# Patient Record
Sex: FEMALE | Race: BLACK OR AFRICAN AMERICAN | NOT HISPANIC OR LATINO
[De-identification: names, ages, dates, MRNs, and addresses within clinical notes are randomized per-mention and may not be internally consistent; named-entity substitution may affect disease eponyms.]

---

## 2020-01-08 ENCOUNTER — APPOINTMENT (OUTPATIENT)
Dept: OBGYN | Facility: CLINIC | Age: 50
End: 2020-01-08
Payer: COMMERCIAL

## 2020-01-08 VITALS — SYSTOLIC BLOOD PRESSURE: 134 MMHG | DIASTOLIC BLOOD PRESSURE: 81 MMHG | WEIGHT: 168 LBS | HEART RATE: 80 BPM

## 2020-01-08 DIAGNOSIS — D25.9 LEIOMYOMA OF UTERUS, UNSPECIFIED: ICD-10-CM

## 2020-01-08 DIAGNOSIS — J45.909 UNSPECIFIED ASTHMA, UNCOMPLICATED: ICD-10-CM

## 2020-01-08 DIAGNOSIS — Z80.42 FAMILY HISTORY OF MALIGNANT NEOPLASM OF PROSTATE: ICD-10-CM

## 2020-01-08 DIAGNOSIS — Z82.49 FAMILY HISTORY OF ISCHEMIC HEART DISEASE AND OTHER DISEASES OF THE CIRCULATORY SYSTEM: ICD-10-CM

## 2020-01-08 PROBLEM — Z00.00 ENCOUNTER FOR PREVENTIVE HEALTH EXAMINATION: Status: ACTIVE | Noted: 2020-01-08

## 2020-01-08 PROCEDURE — 99203 OFFICE O/P NEW LOW 30 MIN: CPT

## 2020-01-08 RX ORDER — FERROUS SULFATE 325(65) MG
325 (65 FE) TABLET ORAL
Refills: 0 | Status: ACTIVE | COMMUNITY
Start: 2020-01-08

## 2020-01-09 NOTE — HISTORY OF PRESENT ILLNESS
[Last Mammogram ___] : Last Mammogram was [unfilled] [Last Pap ___] : Last cervical pap smear was [unfilled] [Last Colonoscopy ___] : Last colonoscopy [unfilled] [Regular Cycle Intervals] : periods have been regular [Sexually Active] : is not sexually active

## 2020-02-20 ENCOUNTER — APPOINTMENT (OUTPATIENT)
Dept: OBGYN | Facility: CLINIC | Age: 50
End: 2020-02-20
Payer: COMMERCIAL

## 2020-02-20 PROCEDURE — 36415 COLL VENOUS BLD VENIPUNCTURE: CPT

## 2020-02-20 PROCEDURE — 99213 OFFICE O/P EST LOW 20 MIN: CPT

## 2020-02-20 PROCEDURE — 99203 OFFICE O/P NEW LOW 30 MIN: CPT

## 2020-03-09 ENCOUNTER — OUTPATIENT (OUTPATIENT)
Dept: OUTPATIENT SERVICES | Facility: HOSPITAL | Age: 50
LOS: 1 days | End: 2020-03-09
Payer: COMMERCIAL

## 2020-03-09 ENCOUNTER — APPOINTMENT (OUTPATIENT)
Dept: MRI IMAGING | Facility: CLINIC | Age: 50
End: 2020-03-09
Payer: COMMERCIAL

## 2020-03-09 DIAGNOSIS — Z00.8 ENCOUNTER FOR OTHER GENERAL EXAMINATION: ICD-10-CM

## 2020-03-09 PROCEDURE — 72197 MRI PELVIS W/O & W/DYE: CPT | Mod: 26

## 2020-03-09 PROCEDURE — A9585: CPT

## 2020-03-09 PROCEDURE — 72197 MRI PELVIS W/O & W/DYE: CPT

## 2020-04-10 ENCOUNTER — APPOINTMENT (OUTPATIENT)
Dept: OBGYN | Facility: CLINIC | Age: 50
End: 2020-04-10

## 2020-08-12 ENCOUNTER — APPOINTMENT (OUTPATIENT)
Dept: OBGYN | Facility: CLINIC | Age: 50
End: 2020-08-12
Payer: COMMERCIAL

## 2020-08-12 ENCOUNTER — RESULT REVIEW (OUTPATIENT)
Age: 50
End: 2020-08-12

## 2020-08-12 PROCEDURE — 36415 COLL VENOUS BLD VENIPUNCTURE: CPT

## 2020-08-12 PROCEDURE — 58100 BIOPSY OF UTERUS LINING: CPT

## 2020-08-26 ENCOUNTER — TRANSCRIPTION ENCOUNTER (OUTPATIENT)
Age: 50
End: 2020-08-26

## 2020-09-25 ENCOUNTER — OUTPATIENT (OUTPATIENT)
Dept: OUTPATIENT SERVICES | Facility: HOSPITAL | Age: 50
LOS: 1 days | Discharge: HOME | End: 2020-09-25

## 2020-09-25 DIAGNOSIS — D64.9 ANEMIA, UNSPECIFIED: ICD-10-CM

## 2020-10-05 ENCOUNTER — RESULT REVIEW (OUTPATIENT)
Age: 50
End: 2020-10-05

## 2020-10-05 ENCOUNTER — APPOINTMENT (OUTPATIENT)
Dept: HEMATOLOGY ONCOLOGY | Facility: CLINIC | Age: 50
End: 2020-10-05
Payer: COMMERCIAL

## 2020-10-05 VITALS
SYSTOLIC BLOOD PRESSURE: 119 MMHG | TEMPERATURE: 97.3 F | OXYGEN SATURATION: 100 % | RESPIRATION RATE: 16 BRPM | WEIGHT: 169.76 LBS | HEIGHT: 66.14 IN | BODY MASS INDEX: 27.28 KG/M2 | HEART RATE: 77 BPM | DIASTOLIC BLOOD PRESSURE: 80 MMHG

## 2020-10-05 DIAGNOSIS — N92.0 EXCESSIVE AND FREQUENT MENSTRUATION WITH REGULAR CYCLE: ICD-10-CM

## 2020-10-05 DIAGNOSIS — D64.9 ANEMIA, UNSPECIFIED: ICD-10-CM

## 2020-10-05 LAB
BASOPHILS # BLD AUTO: 0.03 K/UL — SIGNIFICANT CHANGE UP (ref 0–0.2)
BASOPHILS NFR BLD AUTO: 0.8 % — SIGNIFICANT CHANGE UP (ref 0–2)
EOSINOPHIL # BLD AUTO: 0.26 K/UL — SIGNIFICANT CHANGE UP (ref 0–0.5)
EOSINOPHIL NFR BLD AUTO: 7.4 % — HIGH (ref 0–6)
HCT VFR BLD CALC: 38 % — SIGNIFICANT CHANGE UP (ref 34.5–45)
HGB BLD-MCNC: 11.4 G/DL — LOW (ref 11.5–15.5)
IMM GRANULOCYTES NFR BLD AUTO: 0.3 % — SIGNIFICANT CHANGE UP (ref 0–1.5)
LYMPHOCYTES # BLD AUTO: 1.33 K/UL — SIGNIFICANT CHANGE UP (ref 1–3.3)
LYMPHOCYTES # BLD AUTO: 37.7 % — SIGNIFICANT CHANGE UP (ref 13–44)
MCHC RBC-ENTMCNC: 25.6 PG — LOW (ref 27–34)
MCHC RBC-ENTMCNC: 30 G/DL — LOW (ref 32–36)
MCV RBC AUTO: 85.4 FL — SIGNIFICANT CHANGE UP (ref 80–100)
MONOCYTES # BLD AUTO: 0.32 K/UL — SIGNIFICANT CHANGE UP (ref 0–0.9)
MONOCYTES NFR BLD AUTO: 9.1 % — SIGNIFICANT CHANGE UP (ref 2–14)
NEUTROPHILS # BLD AUTO: 1.58 K/UL — LOW (ref 1.8–7.4)
NEUTROPHILS NFR BLD AUTO: 44.7 % — SIGNIFICANT CHANGE UP (ref 43–77)
NRBC # BLD: 0 /100 WBCS — SIGNIFICANT CHANGE UP (ref 0–0)
PLATELET # BLD AUTO: 248 K/UL — SIGNIFICANT CHANGE UP (ref 150–400)
RBC # BLD: 4.45 M/UL — SIGNIFICANT CHANGE UP (ref 3.8–5.2)
RBC # FLD: 17.6 % — HIGH (ref 10.3–14.5)
RETICS #: 124.5 K/UL — SIGNIFICANT CHANGE UP (ref 25–125)
RETICS/RBC NFR: 2.8 % — HIGH (ref 0.5–2.5)
WBC # BLD: 3.53 K/UL — LOW (ref 3.8–10.5)
WBC # FLD AUTO: 3.53 K/UL — LOW (ref 3.8–10.5)

## 2020-10-05 PROCEDURE — XXXXX: CPT

## 2020-10-12 ENCOUNTER — OUTPATIENT (OUTPATIENT)
Dept: OUTPATIENT SERVICES | Facility: HOSPITAL | Age: 50
LOS: 1 days | End: 2020-10-12
Payer: COMMERCIAL

## 2020-10-12 VITALS
SYSTOLIC BLOOD PRESSURE: 106 MMHG | WEIGHT: 169.98 LBS | DIASTOLIC BLOOD PRESSURE: 73 MMHG | RESPIRATION RATE: 16 BRPM | TEMPERATURE: 98 F | OXYGEN SATURATION: 98 % | HEART RATE: 80 BPM | HEIGHT: 65 IN

## 2020-10-12 DIAGNOSIS — Z29.9 ENCOUNTER FOR PROPHYLACTIC MEASURES, UNSPECIFIED: ICD-10-CM

## 2020-10-12 DIAGNOSIS — Z98.890 OTHER SPECIFIED POSTPROCEDURAL STATES: Chronic | ICD-10-CM

## 2020-10-12 DIAGNOSIS — N92.1 EXCESSIVE AND FREQUENT MENSTRUATION WITH IRREGULAR CYCLE: ICD-10-CM

## 2020-10-12 DIAGNOSIS — D25.9 LEIOMYOMA OF UTERUS, UNSPECIFIED: ICD-10-CM

## 2020-10-12 DIAGNOSIS — Z01.818 ENCOUNTER FOR OTHER PREPROCEDURAL EXAMINATION: ICD-10-CM

## 2020-10-12 LAB
A1C WITH ESTIMATED AVERAGE GLUCOSE RESULT: 5.3 % — SIGNIFICANT CHANGE UP (ref 4–5.6)
ANION GAP SERPL CALC-SCNC: 9 MMOL/L — SIGNIFICANT CHANGE UP (ref 5–17)
BLD GP AB SCN SERPL QL: NEGATIVE — SIGNIFICANT CHANGE UP
BUN SERPL-MCNC: 16 MG/DL — SIGNIFICANT CHANGE UP (ref 7–23)
CALCIUM SERPL-MCNC: 9.6 MG/DL — SIGNIFICANT CHANGE UP (ref 8.4–10.5)
CHLORIDE SERPL-SCNC: 103 MMOL/L — SIGNIFICANT CHANGE UP (ref 96–108)
CO2 SERPL-SCNC: 26 MMOL/L — SIGNIFICANT CHANGE UP (ref 22–31)
CREAT SERPL-MCNC: 0.88 MG/DL — SIGNIFICANT CHANGE UP (ref 0.5–1.3)
ESTIMATED AVERAGE GLUCOSE: 105 MG/DL — SIGNIFICANT CHANGE UP (ref 68–114)
GLUCOSE SERPL-MCNC: 102 MG/DL — HIGH (ref 70–99)
HCV AB S/CO SERPL IA: 0.13 S/CO — SIGNIFICANT CHANGE UP (ref 0–0.99)
HCV AB SERPL-IMP: SIGNIFICANT CHANGE UP
HIV 1+2 AB+HIV1 P24 AG SERPL QL IA: SIGNIFICANT CHANGE UP
POTASSIUM SERPL-MCNC: 4.4 MMOL/L — SIGNIFICANT CHANGE UP (ref 3.5–5.3)
POTASSIUM SERPL-SCNC: 4.4 MMOL/L — SIGNIFICANT CHANGE UP (ref 3.5–5.3)
RH IG SCN BLD-IMP: NEGATIVE — SIGNIFICANT CHANGE UP
SODIUM SERPL-SCNC: 138 MMOL/L — SIGNIFICANT CHANGE UP (ref 135–145)

## 2020-10-12 PROCEDURE — 86901 BLOOD TYPING SEROLOGIC RH(D): CPT

## 2020-10-12 PROCEDURE — 86803 HEPATITIS C AB TEST: CPT

## 2020-10-12 PROCEDURE — 86900 BLOOD TYPING SEROLOGIC ABO: CPT

## 2020-10-12 PROCEDURE — 83036 HEMOGLOBIN GLYCOSYLATED A1C: CPT

## 2020-10-12 PROCEDURE — 86850 RBC ANTIBODY SCREEN: CPT

## 2020-10-12 PROCEDURE — G0463: CPT

## 2020-10-12 PROCEDURE — 80048 BASIC METABOLIC PNL TOTAL CA: CPT

## 2020-10-12 PROCEDURE — 87389 HIV-1 AG W/HIV-1&-2 AB AG IA: CPT

## 2020-10-12 NOTE — H&P PST ADULT - NSANTHOSAYNRD_GEN_A_CORE
No. [place] screening performed.  STOP BANG Legend: 0-2 = LOW Risk; 3-4 = INTERMEDIATE Risk; 5-8 = HIGH Risk

## 2020-10-12 NOTE — H&P PST ADULT - ALLERGY TYPES
indoor environmental allergies/reactions to medicines/outdoor environmental allergies/reactions to food

## 2020-10-12 NOTE — H&P PST ADULT - NSICDXPASTMEDICALHX_GEN_ALL_CORE_FT
PAST MEDICAL HISTORY:  Asthma     Facial eczema     LORENA (iron deficiency anemia)     Menorrhagia with irregular cycle     Menstrual spotting     Seasonal allergies     Uterine fibroid

## 2020-10-12 NOTE — H&P PST ADULT - HISTORY OF PRESENT ILLNESS
51 y/o female with h/o Asthma and uterine fibroids s/p uterine embolization 2018, c/o menorrhagia with regular cycle,  spotting between period, bloating and feels that fibroids are growing. Today she presents to PST for scheduled Total Abdominal Hysterectomy Bilateral Salpingectomy on 10/19/20. Denies any palpitations, SOB, N/V, fever or chills.     ***COVID swab scheduled for 10/16/20***

## 2020-10-12 NOTE — H&P PST ADULT - NSICDXPROBLEM_GEN_ALL_CORE_FT
PROBLEM DIAGNOSES  Problem: Fibroid, uterine  Assessment and Plan: Total Abdominal Hysterectomy Bilateral Salpingectomy on 10/19/20.  Pre-op education provided - all questions answered. Pt verbalized understanding     Problem: Need for prophylactic measure  Assessment and Plan: The Caprini score indicates this patient is at risk for a VTE event (score 3-5).  Most surgical patients in this group would benefit from pharmacologic prophylaxis.  The surgical team will determine the balance between VTE risk and bleeding risk

## 2020-10-12 NOTE — H&P PST ADULT - ATTENDING COMMENTS
pt seen and examined. all questions answered. to proceed with scheduled procedure. total abdominal hysterectomy, bilateral salpingectomy.     ELI Daniel

## 2020-10-12 NOTE — H&P PST ADULT - HARM RISK FACTORS
Gisell Boothe)  Pediatrics  [facility] [place] [address]  Phone: [phone]  Fax: [phone]  Follow Up Time: 1-3 Days
no

## 2020-10-12 NOTE — H&P PST ADULT - ASSESSMENT
KATIEI VTE 2.0 SCORE [CLOT updated 2019]    AGE RELATED RISK FACTORS                                                       MOBILITY RELATED FACTORS  [x ] Age 41-60 years                                            (1 Point)                    [ ] Bed rest                                                        (1 Point)  [ ] Age: 61-74 years                                           (2 Points)                  [ ] Plaster cast                                                   (2 Points)  [ ] Age= 75 years                                              (3 Points)                    [ ] Bed bound for more than 72 hours                 (2 Points)    DISEASE RELATED RISK FACTORS                                               GENDER SPECIFIC FACTORS  [ ] Edema in the lower extremities                       (1 Point)              [ ] Pregnancy                                                     (1 Point)  [ ] Varicose veins                                               (1 Point)                     [ ] Post-partum < 6 weeks                                   (1 Point)             [x ] BMI > 25 Kg/m2                                            (1 Point)                     [ ] Hormonal therapy  or oral contraception          (1 Point)                 [ ] Sepsis (in the previous month)                        (1 Point)               [ ] History of pregnancy complications                 (1 point)  [ ] Pneumonia or serious lung disease                                               [ ] Unexplained or recurrent                     (1 Point)           (in the previous month)                               (1 Point)  [ ] Abnormal pulmonary function test                     (1 Point)                 SURGERY RELATED RISK FACTORS  [ ] Acute myocardial infarction                              (1 Point)               [ ]  Section                                             (1 Point)  [ ] Congestive heart failure (in the previous month)  (1 Point)      [ ] Minor surgery                                                  (1 Point)   [ ] Inflammatory bowel disease                             (1 Point)               [ ] Arthroscopic surgery                                        (2 Points)  [ ] Central venous access                                      (2 Points)                [ x] General surgery lasting more than 45 minutes (2 points)  [ ] Malignancy- Present or previous                   (2 Points)                [ ] Elective arthroplasty                                         (5 points)    [ ] Stroke (in the previous month)                          (5 Points)                                                                                                                                                           HEMATOLOGY RELATED FACTORS                                                 TRAUMA RELATED RISK FACTORS  [ ] Prior episodes of VTE                                     (3 Points)                [ ] Fracture of the hip, pelvis, or leg                       (5 Points)  [ ] Positive family history for VTE                         (3 Points)             [ ] Acute spinal cord injury (in the previous month)  (5 Points)  [ ] Prothrombin 32751 A                                     (3 Points)               [ ] Paralysis  (less than 1 month)                             (5 Points)  [ ] Factor V Leiden                                             (3 Points)                  [ ] Multiple Trauma within 1 month                        (5 Points)  [ ] Lupus anticoagulants                                     (3 Points)                                                           [ ] Anticardiolipin antibodies                               (3 Points)                                                       [ ] High homocysteine in the blood                      (3 Points)                                             [ ] Other congenital or acquired thrombophilia      (3 Points)                                                [ ] Heparin induced thrombocytopenia                  (3 Points)                                     Total Score [   4       ]

## 2020-10-14 DIAGNOSIS — Z01.818 ENCOUNTER FOR OTHER PREPROCEDURAL EXAMINATION: ICD-10-CM

## 2020-10-16 ENCOUNTER — APPOINTMENT (OUTPATIENT)
Dept: DISASTER EMERGENCY | Facility: CLINIC | Age: 50
End: 2020-10-16

## 2020-10-17 LAB — SARS-COV-2 N GENE NPH QL NAA+PROBE: NOT DETECTED

## 2020-10-18 ENCOUNTER — TRANSCRIPTION ENCOUNTER (OUTPATIENT)
Age: 50
End: 2020-10-18

## 2020-10-19 ENCOUNTER — INPATIENT (INPATIENT)
Facility: HOSPITAL | Age: 50
LOS: 2 days | Discharge: HOME | DRG: 743 | End: 2020-10-22
Attending: STUDENT IN AN ORGANIZED HEALTH CARE EDUCATION/TRAINING PROGRAM | Admitting: STUDENT IN AN ORGANIZED HEALTH CARE EDUCATION/TRAINING PROGRAM
Payer: COMMERCIAL

## 2020-10-19 ENCOUNTER — APPOINTMENT (OUTPATIENT)
Dept: OBGYN | Facility: HOSPITAL | Age: 50
End: 2020-10-19

## 2020-10-19 ENCOUNTER — RESULT REVIEW (OUTPATIENT)
Age: 50
End: 2020-10-19

## 2020-10-19 VITALS
OXYGEN SATURATION: 97 % | HEIGHT: 65 IN | RESPIRATION RATE: 18 BRPM | HEART RATE: 89 BPM | SYSTOLIC BLOOD PRESSURE: 122 MMHG | TEMPERATURE: 98 F | WEIGHT: 170.64 LBS | DIASTOLIC BLOOD PRESSURE: 79 MMHG

## 2020-10-19 DIAGNOSIS — N92.1 EXCESSIVE AND FREQUENT MENSTRUATION WITH IRREGULAR CYCLE: ICD-10-CM

## 2020-10-19 DIAGNOSIS — Z98.890 OTHER SPECIFIED POSTPROCEDURAL STATES: Chronic | ICD-10-CM

## 2020-10-19 DIAGNOSIS — D25.9 LEIOMYOMA OF UTERUS, UNSPECIFIED: ICD-10-CM

## 2020-10-19 LAB
ALBUMIN SERPL ELPH-MCNC: 3.6 G/DL — SIGNIFICANT CHANGE UP (ref 3.3–5)
ALP SERPL-CCNC: 46 U/L — SIGNIFICANT CHANGE UP (ref 40–120)
ALT FLD-CCNC: 8 U/L — LOW (ref 10–45)
ANION GAP SERPL CALC-SCNC: 11 MMOL/L — SIGNIFICANT CHANGE UP (ref 5–17)
AST SERPL-CCNC: 23 U/L — SIGNIFICANT CHANGE UP (ref 10–40)
BILIRUB SERPL-MCNC: 0.2 MG/DL — SIGNIFICANT CHANGE UP (ref 0.2–1.2)
BUN SERPL-MCNC: 13 MG/DL — SIGNIFICANT CHANGE UP (ref 7–23)
CALCIUM SERPL-MCNC: 8.4 MG/DL — SIGNIFICANT CHANGE UP (ref 8.4–10.5)
CHLORIDE SERPL-SCNC: 102 MMOL/L — SIGNIFICANT CHANGE UP (ref 96–108)
CO2 SERPL-SCNC: 22 MMOL/L — SIGNIFICANT CHANGE UP (ref 22–31)
CREAT SERPL-MCNC: 0.92 MG/DL — SIGNIFICANT CHANGE UP (ref 0.5–1.3)
GLUCOSE BLDC GLUCOMTR-MCNC: 114 MG/DL — HIGH (ref 70–99)
GLUCOSE SERPL-MCNC: 96 MG/DL — SIGNIFICANT CHANGE UP (ref 70–99)
HCG UR QL: NEGATIVE — SIGNIFICANT CHANGE UP
HCT VFR BLD CALC: 37.8 % — SIGNIFICANT CHANGE UP (ref 34.5–45)
HGB BLD-MCNC: 11.5 G/DL — SIGNIFICANT CHANGE UP (ref 11.5–15.5)
MAGNESIUM SERPL-MCNC: 1.9 MG/DL — SIGNIFICANT CHANGE UP (ref 1.6–2.6)
MCHC RBC-ENTMCNC: 26.1 PG — LOW (ref 27–34)
MCHC RBC-ENTMCNC: 30.4 GM/DL — LOW (ref 32–36)
MCV RBC AUTO: 85.7 FL — SIGNIFICANT CHANGE UP (ref 80–100)
NRBC # BLD: 0 /100 WBCS — SIGNIFICANT CHANGE UP (ref 0–0)
PLATELET # BLD AUTO: 217 K/UL — SIGNIFICANT CHANGE UP (ref 150–400)
POTASSIUM SERPL-MCNC: 4 MMOL/L — SIGNIFICANT CHANGE UP (ref 3.5–5.3)
POTASSIUM SERPL-SCNC: 4 MMOL/L — SIGNIFICANT CHANGE UP (ref 3.5–5.3)
PROT SERPL-MCNC: 7.3 G/DL — SIGNIFICANT CHANGE UP (ref 6–8.3)
RBC # BLD: 4.41 M/UL — SIGNIFICANT CHANGE UP (ref 3.8–5.2)
RBC # FLD: 17.9 % — HIGH (ref 10.3–14.5)
RH IG SCN BLD-IMP: NEGATIVE — SIGNIFICANT CHANGE UP
SODIUM SERPL-SCNC: 135 MMOL/L — SIGNIFICANT CHANGE UP (ref 135–145)
WBC # BLD: 10.25 K/UL — SIGNIFICANT CHANGE UP (ref 3.8–10.5)
WBC # FLD AUTO: 10.25 K/UL — SIGNIFICANT CHANGE UP (ref 3.8–10.5)

## 2020-10-19 PROCEDURE — 58573 TLH W/T/O UTERUS OVER 250 G: CPT

## 2020-10-19 PROCEDURE — 88307 TISSUE EXAM BY PATHOLOGIST: CPT | Mod: 26

## 2020-10-19 PROCEDURE — 58573 TLH W/T/O UTERUS OVER 250 G: CPT | Mod: 80

## 2020-10-19 PROCEDURE — 88302 TISSUE EXAM BY PATHOLOGIST: CPT | Mod: 26

## 2020-10-19 PROCEDURE — 88305 TISSUE EXAM BY PATHOLOGIST: CPT | Mod: 26

## 2020-10-19 RX ORDER — ONDANSETRON 8 MG/1
4 TABLET, FILM COATED ORAL ONCE
Refills: 0 | Status: DISCONTINUED | OUTPATIENT
Start: 2020-10-19 | End: 2020-10-19

## 2020-10-19 RX ORDER — CELECOXIB 200 MG/1
200 CAPSULE ORAL ONCE
Refills: 0 | Status: COMPLETED | OUTPATIENT
Start: 2020-10-19 | End: 2020-10-19

## 2020-10-19 RX ORDER — ACETAMINOPHEN 500 MG
975 TABLET ORAL ONCE
Refills: 0 | Status: DISCONTINUED | OUTPATIENT
Start: 2020-10-19 | End: 2020-10-19

## 2020-10-19 RX ORDER — HYDROMORPHONE HYDROCHLORIDE 2 MG/ML
0.5 INJECTION INTRAMUSCULAR; INTRAVENOUS; SUBCUTANEOUS
Refills: 0 | Status: DISCONTINUED | OUTPATIENT
Start: 2020-10-19 | End: 2020-10-19

## 2020-10-19 RX ORDER — LIDOCAINE HCL 20 MG/ML
0.2 VIAL (ML) INJECTION ONCE
Refills: 0 | Status: DISCONTINUED | OUTPATIENT
Start: 2020-10-19 | End: 2020-10-19

## 2020-10-19 RX ORDER — METRONIDAZOLE 500 MG
500 TABLET ORAL EVERY 12 HOURS
Refills: 0 | Status: DISCONTINUED | OUTPATIENT
Start: 2020-10-19 | End: 2020-10-22

## 2020-10-19 RX ORDER — CIPROFLOXACIN LACTATE 400MG/40ML
400 VIAL (ML) INTRAVENOUS ONCE
Refills: 0 | Status: DISCONTINUED | OUTPATIENT
Start: 2020-10-19 | End: 2020-10-19

## 2020-10-19 RX ORDER — ACETAMINOPHEN 500 MG
1000 TABLET ORAL ONCE
Refills: 0 | Status: COMPLETED | OUTPATIENT
Start: 2020-10-20 | End: 2020-10-20

## 2020-10-19 RX ORDER — ALBUTEROL 90 UG/1
2 AEROSOL, METERED ORAL EVERY 6 HOURS
Refills: 0 | Status: DISCONTINUED | OUTPATIENT
Start: 2020-10-19 | End: 2020-10-22

## 2020-10-19 RX ORDER — HEPARIN SODIUM 5000 [USP'U]/ML
5000 INJECTION INTRAVENOUS; SUBCUTANEOUS EVERY 12 HOURS
Refills: 0 | Status: DISCONTINUED | OUTPATIENT
Start: 2020-10-19 | End: 2020-10-22

## 2020-10-19 RX ORDER — SIMETHICONE 80 MG/1
80 TABLET, CHEWABLE ORAL EVERY 8 HOURS
Refills: 0 | Status: DISCONTINUED | OUTPATIENT
Start: 2020-10-19 | End: 2020-10-22

## 2020-10-19 RX ORDER — ONDANSETRON 8 MG/1
4 TABLET, FILM COATED ORAL EVERY 6 HOURS
Refills: 0 | Status: DISCONTINUED | OUTPATIENT
Start: 2020-10-19 | End: 2020-10-20

## 2020-10-19 RX ORDER — INFLUENZA VIRUS VACCINE 15; 15; 15; 15 UG/.5ML; UG/.5ML; UG/.5ML; UG/.5ML
0.5 SUSPENSION INTRAMUSCULAR ONCE
Refills: 0 | Status: DISCONTINUED | OUTPATIENT
Start: 2020-10-19 | End: 2020-10-22

## 2020-10-19 RX ORDER — ACETAMINOPHEN 500 MG
1000 TABLET ORAL ONCE
Refills: 0 | Status: COMPLETED | OUTPATIENT
Start: 2020-10-19 | End: 2020-10-19

## 2020-10-19 RX ORDER — SODIUM CHLORIDE 9 MG/ML
1000 INJECTION, SOLUTION INTRAVENOUS
Refills: 0 | Status: DISCONTINUED | OUTPATIENT
Start: 2020-10-19 | End: 2020-10-20

## 2020-10-19 RX ORDER — OXYCODONE HYDROCHLORIDE 5 MG/1
10 TABLET ORAL EVERY 4 HOURS
Refills: 0 | Status: DISCONTINUED | OUTPATIENT
Start: 2020-10-19 | End: 2020-10-19

## 2020-10-19 RX ORDER — VANCOMYCIN HCL 1 G
1000 VIAL (EA) INTRAVENOUS ONCE
Refills: 0 | Status: DISCONTINUED | OUTPATIENT
Start: 2020-10-19 | End: 2020-10-19

## 2020-10-19 RX ORDER — HYDROMORPHONE HYDROCHLORIDE 2 MG/ML
0.5 INJECTION INTRAMUSCULAR; INTRAVENOUS; SUBCUTANEOUS ONCE
Refills: 0 | Status: DISCONTINUED | OUTPATIENT
Start: 2020-10-19 | End: 2020-10-19

## 2020-10-19 RX ORDER — IBUPROFEN 200 MG
600 TABLET ORAL EVERY 6 HOURS
Refills: 0 | Status: COMPLETED | OUTPATIENT
Start: 2020-10-19 | End: 2021-09-17

## 2020-10-19 RX ORDER — SODIUM CHLORIDE 9 MG/ML
3 INJECTION INTRAMUSCULAR; INTRAVENOUS; SUBCUTANEOUS EVERY 8 HOURS
Refills: 0 | Status: DISCONTINUED | OUTPATIENT
Start: 2020-10-19 | End: 2020-10-19

## 2020-10-19 RX ORDER — IBUPROFEN 200 MG
600 TABLET ORAL EVERY 6 HOURS
Refills: 0 | Status: DISCONTINUED | OUTPATIENT
Start: 2020-10-19 | End: 2020-10-22

## 2020-10-19 RX ORDER — ONDANSETRON 8 MG/1
8 TABLET, FILM COATED ORAL EVERY 8 HOURS
Refills: 0 | Status: DISCONTINUED | OUTPATIENT
Start: 2020-10-19 | End: 2020-10-22

## 2020-10-19 RX ORDER — FAMOTIDINE 10 MG/ML
20 INJECTION INTRAVENOUS ONCE
Refills: 0 | Status: COMPLETED | OUTPATIENT
Start: 2020-10-19 | End: 2020-10-19

## 2020-10-19 RX ORDER — HYDROMORPHONE HYDROCHLORIDE 2 MG/ML
1 INJECTION INTRAMUSCULAR; INTRAVENOUS; SUBCUTANEOUS
Refills: 0 | Status: DISCONTINUED | OUTPATIENT
Start: 2020-10-19 | End: 2020-10-19

## 2020-10-19 RX ORDER — HYDROMORPHONE HYDROCHLORIDE 2 MG/ML
30 INJECTION INTRAMUSCULAR; INTRAVENOUS; SUBCUTANEOUS
Refills: 0 | Status: DISCONTINUED | OUTPATIENT
Start: 2020-10-19 | End: 2020-10-20

## 2020-10-19 RX ORDER — ACETAMINOPHEN 500 MG
1000 TABLET ORAL EVERY 8 HOURS
Refills: 0 | Status: DISCONTINUED | OUTPATIENT
Start: 2020-10-19 | End: 2020-10-19

## 2020-10-19 RX ORDER — FENTANYL CITRATE 50 UG/ML
25 INJECTION INTRAVENOUS
Refills: 0 | Status: DISCONTINUED | OUTPATIENT
Start: 2020-10-19 | End: 2020-10-19

## 2020-10-19 RX ORDER — NALOXONE HYDROCHLORIDE 4 MG/.1ML
0.1 SPRAY NASAL
Refills: 0 | Status: DISCONTINUED | OUTPATIENT
Start: 2020-10-19 | End: 2020-10-20

## 2020-10-19 RX ORDER — OXYCODONE HYDROCHLORIDE 5 MG/1
5 TABLET ORAL EVERY 4 HOURS
Refills: 0 | Status: DISCONTINUED | OUTPATIENT
Start: 2020-10-19 | End: 2020-10-19

## 2020-10-19 RX ADMIN — HYDROMORPHONE HYDROCHLORIDE 30 MILLILITER(S): 2 INJECTION INTRAMUSCULAR; INTRAVENOUS; SUBCUTANEOUS at 23:48

## 2020-10-19 RX ADMIN — HYDROMORPHONE HYDROCHLORIDE 0.5 MILLIGRAM(S): 2 INJECTION INTRAMUSCULAR; INTRAVENOUS; SUBCUTANEOUS at 22:23

## 2020-10-19 RX ADMIN — FAMOTIDINE 20 MILLIGRAM(S): 10 INJECTION INTRAVENOUS at 06:45

## 2020-10-19 RX ADMIN — Medication 400 MILLIGRAM(S): at 16:57

## 2020-10-19 RX ADMIN — SODIUM CHLORIDE 3 MILLILITER(S): 9 INJECTION INTRAMUSCULAR; INTRAVENOUS; SUBCUTANEOUS at 06:45

## 2020-10-19 RX ADMIN — OXYCODONE HYDROCHLORIDE 10 MILLIGRAM(S): 5 TABLET ORAL at 18:19

## 2020-10-19 RX ADMIN — Medication 400 MILLIGRAM(S): at 22:30

## 2020-10-19 RX ADMIN — HYDROMORPHONE HYDROCHLORIDE 0.5 MILLIGRAM(S): 2 INJECTION INTRAMUSCULAR; INTRAVENOUS; SUBCUTANEOUS at 23:00

## 2020-10-19 RX ADMIN — SODIUM CHLORIDE 75 MILLILITER(S): 9 INJECTION, SOLUTION INTRAVENOUS at 14:17

## 2020-10-19 RX ADMIN — HEPARIN SODIUM 5000 UNIT(S): 5000 INJECTION INTRAVENOUS; SUBCUTANEOUS at 22:30

## 2020-10-19 RX ADMIN — Medication 600 MILLIGRAM(S): at 22:23

## 2020-10-19 RX ADMIN — OXYCODONE HYDROCHLORIDE 10 MILLIGRAM(S): 5 TABLET ORAL at 18:49

## 2020-10-19 RX ADMIN — Medication 500 MILLIGRAM(S): at 18:26

## 2020-10-19 RX ADMIN — HYDROMORPHONE HYDROCHLORIDE 0.5 MILLIGRAM(S): 2 INJECTION INTRAMUSCULAR; INTRAVENOUS; SUBCUTANEOUS at 13:00

## 2020-10-19 RX ADMIN — CELECOXIB 200 MILLIGRAM(S): 200 CAPSULE ORAL at 06:45

## 2020-10-19 RX ADMIN — Medication 1000 MILLIGRAM(S): at 23:00

## 2020-10-19 RX ADMIN — HYDROMORPHONE HYDROCHLORIDE 0.5 MILLIGRAM(S): 2 INJECTION INTRAMUSCULAR; INTRAVENOUS; SUBCUTANEOUS at 12:40

## 2020-10-19 RX ADMIN — SIMETHICONE 80 MILLIGRAM(S): 80 TABLET, CHEWABLE ORAL at 22:30

## 2020-10-19 NOTE — BRIEF OPERATIVE NOTE - COMMENTS
Dictation per Dr. Robbins  Vistaseal and Surgicel placed over vaginal suff Dictation per Dr. Robbins (#[id number])   Vistaseal and Surgicel placed over vaginal suff Dictation per Dr. Robbins (#[id number])   Vistaseal and Surgicel placed over vaginal cuff

## 2020-10-19 NOTE — BRIEF OPERATIVE NOTE - OPERATION/FINDINGS
EUA  - mobile enlarged 40wk sized uterus up to 5cm above umbilicus  - mobile nodularity of posterior vaginal mucosa  - smooth rectovaginal septum  - accessory nipple left midclavicular line underneath left breast  Laparotomy  - enlarge fibroid uterus  - supernumerary left ovary, normal left tube  - right ovary adherent to posterior uterus with intraop rupture of chocolate colored fluid c/w endometrioma; normal right fallopian tube   - dense rectosigmoid adhesions to posterior vagina   - normal appearing cervix and vaginal mucosa   - bilateral ureters palpated and identified

## 2020-10-19 NOTE — CHART NOTE - NSCHARTNOTEFT_GEN_A_CORE
R2 Gyn Chart Note    Pt assessed at bedside for inadequate pain control. Pt states she only received IV Tylenol and did not receive Oxycodone at all. States Motrin wont touch her pain. She is otherwise tolerating liquids    VS  T(C): 36.4 (10-19-20 @ 20:56)  HR: 93 (10-19-20 @ 20:56)  BP: 130/82 (10-19-20 @ 20:56)  RR: 17 (10-19-20 @ 20:56)  SpO2: 97% (10-19-20 @ 20:56)    Physical Exam:  Gen: Lying in bed crying  Resp: unlabored on RA  CV: RR  GI: soft, appropriately tender, mildly distended, + BS  Incision: midline vertical incision w/ bandage in place c/d/i  Ext: SCDs in place    A/P: 51yo s/p Total Abdominal Hysterectomy, Bilateral Salpingectomy with inadequate pain control post-operatively. Pt s/p IV Tylenol at 5pm, Oxycodone at 6:20pm, and refusing Motrin. Discussed around the clock regimen with patient however given no relief with Oxycodone 10mg and extensive surgery, will call pain service for PCA    - IV Dilaudid 0.5mg x1 now  - Motrin 600mg x1 now  - Pain service contacted and in agreement for PCA    TREVA Becker, PGY-2  d/w Dr. Daniel

## 2020-10-19 NOTE — BRIEF OPERATIVE NOTE - NSICDXBRIEFPROCEDURE_GEN_ALL_CORE_FT
PROCEDURES:  Hysterectomy, total, abdominal, with sacrocolpopexy 19-Oct-2020 12:09:17  Judie Pedro   PROCEDURES:  Hysterectomy, total, abdominal, with salpingectomy 21-Oct-2020 08:09:20  Judie Pedro

## 2020-10-19 NOTE — BRIEF OPERATIVE NOTE - NSICDXBRIEFPOSTOP_GEN_ALL_CORE_FT
POST-OP DIAGNOSIS:  Endometriosis 19-Oct-2020 12:09:42  Judie Pedro  Fibroid uterus 19-Oct-2020 12:09:36  Judie Pedro

## 2020-10-19 NOTE — PRE-OP CHECKLIST - HIBICLENS SHOWER 1 DATE
Pulmonary Progress Note     Assessment / Plan:  1. Abnormal CT Chest - RUL 2.5cm medial nodule with associated mediastinal lymphadenopathy. There is also an enlarged right chest wall lymph node of unclear significance.  DDx includes malignancy and infectiou 17-Oct-2020

## 2020-10-19 NOTE — PROGRESS NOTE ADULT - SUBJECTIVE AND OBJECTIVE BOX
POST-OP CHECK  PA Note    Allergies    Ceclor (Hives; Rash)  Red food dye (Hives; Rash)    S:  Pt sleeping, easily arousable  Pain controlled. Pt denies N/V, SOB, CP, or palpitations. Denies passing Flatus.  Pt still NPO.  Vance in place    O:   T(C): 36 (10-19-20 @ 11:55), Max: 36 (10-19-20 @ 11:55)  HR: 75 (10-19-20 @ 14:00) (72 - 82)  BP: 134/80 (10-19-20 @ 14:00) (129/84 - 142/84)  RR: 16 (10-19-20 @ 14:00) (14 - 18)  SpO2: 95% (10-19-20 @ 14:00) (95% - 100%)  I&O's Summary    19 Oct 2020 07:01  -  19 Oct 2020 14:29  --------------------------------------------------------  IN: 300 mL / OUT: 1125 mL / NET: -825 mL                         OR           PACU  (I)            1300                  IVF LR@75  (O)            500                  Vance in place  EBL           450    CV: RRR  Lungs: CTA B/L  Abd: Hypoactive BS, soft, appropriately tender  Inc: Abdominal binder in place covering clean bandage and midline vertical incision  Ext: SCDs in place, Neg Homans B/L       Labs                   11.5   10.25 )-----------( 217      ( 19 Oct 2020 12:43 )             37.8       10-19    135  |  102  |  13  ----------------------------<  96  4.0   |  22  |  0.92    Ca    8.4      19 Oct 2020 12:43  Mg     1.9     10-19    TPro  7.3  /  Alb  3.6  /  TBili  0.2  /  DBili  x   /  AST  23  /  ALT  8<L>  /  AlkPhos  46  10-19      A/P: 50y Female S/P NICK/BS  with PAST MEDICAL & SURGICAL HISTORY:  Uterine fibroid    Seasonal allergies    LORENA (iron deficiency anemia)    Facial eczema    Asthma    Menstrual spotting    Menorrhagia with irregular cycle    H/O induced   x2    Status post embolization of uterine artery  2018        -Neuro - AAO x 3, Pain controlled currently with dilaudid  -Heme - clinically hemodynamically stable  -CV - asymptomatic, repeat CBC/BMP in am  -Pulm - encourage IS, O2sat 95% on RA  -GI - Diet-  Regular  as Tolerated  - - Vance to gravity    -DVT prophylaxis - SCDs in place, encourage ambulation, SQH tonight  -Meds:   acetaminophen  IVPB .. 1000 milliGRAM(s) IV Intermittent once  acetaminophen  IVPB .. 1000 milliGRAM(s) IV Intermittent once  ALBUTerol    90 MICROgram(s) HFA Inhaler 2 Puff(s) Inhalation every 6 hours PRN  fentaNYL    Injectable 25 MICROGram(s) IV Push every 5 minutes PRN  heparin   Injectable 5000 Unit(s) SubCutaneous every 12 hours  HYDROmorphone  Injectable 0.5 milliGRAM(s) IV Push every 10 minutes PRN  HYDROmorphone  Injectable 1 milliGRAM(s) IV Push every 10 minutes PRN  ibuprofen  Tablet. 600 milliGRAM(s) Oral every 6 hours  influenza   Vaccine 0.5 milliLiter(s) IntraMuscular once  lactated ringers. 1000 milliLiter(s) IV Continuous <Continuous>  metroNIDAZOLE    Tablet 500 milliGRAM(s) Oral every 12 hours  ondansetron    Tablet 8 milliGRAM(s) Oral every 8 hours PRN  ondansetron Injectable 4 milliGRAM(s) IV Push once PRN  oxyCODONE    IR 5 milliGRAM(s) Oral every 4 hours PRN  oxyCODONE    IR 10 milliGRAM(s) Oral every 4 hours PRN  simethicone 80 milliGRAM(s) Chew every 8 hours    -Dispo: stable for transfer from PACU, once meeting all PACU milestones    Courtney Tello PA-C         POST-OP CHECK  PA Note    Allergies    Ceclor (Hives; Rash)  Red food dye (Hives; Rash)    S:  Pt sleeping, easily arousable  Pain controlled. Pt denies N/V, SOB, CP, or palpitations. Denies passing Flatus.  Pt still NPO.  Vance in place    O:   T(C): 36 (10-19-20 @ 11:55), Max: 36 (10-19-20 @ 11:55)  HR: 75 (10-19-20 @ 14:00) (72 - 82)  BP: 134/80 (10-19-20 @ 14:00) (129/84 - 142/84)  RR: 16 (10-19-20 @ 14:00) (14 - 18)  SpO2: 95% (10-19-20 @ 14:00) (95% - 100%)  I&O's Summary    19 Oct 2020 07:01  -  19 Oct 2020 14:29  --------------------------------------------------------  IN: 300 mL / OUT: 1125 mL / NET: -825 mL                         OR           PACU  (I)            1300                  IVF LR@75  (O)            500                  Vance in place  EBL           450    CV: RRR  Lungs: CTA B/L  Abd: Hypoactive BS, soft, appropriately tender  Inc: Abdominal binder in place covering clean bandage and midline vertical incision  Ext: SCDs in place, Neg Homans B/L       Labs                   11.5   10.25 )-----------( 217      ( 19 Oct 2020 12:43 )             37.8       10-19    135  |  102  |  13  ----------------------------<  96  4.0   |  22  |  0.92    Ca    8.4      19 Oct 2020 12:43  Mg     1.9     10-19    TPro  7.3  /  Alb  3.6  /  TBili  0.2  /  DBili  x   /  AST  23  /  ALT  8<L>  /  AlkPhos  46  10-19      A/P: 50y Female S/P NICK/BS  with PAST MEDICAL & SURGICAL HISTORY:  Uterine fibroid    Seasonal allergies    LORENA (iron deficiency anemia)    Facial eczema    Asthma    Menstrual spotting    Menorrhagia with irregular cycle    H/O induced   x2    Status post embolization of uterine artery  2018        -Neuro - AAO x 3, Pain controlled currently with dilaudid.  Motrin, IV tylenol and oxycodone ordered as needed for further pain control  -Heme - clinically hemodynamically stable  -CV - asymptomatic, repeat CBC/BMP in am  -Pulm - encourage IS, O2sat 95% on RA  -GI - Diet-  Regular  as Tolerated  - - Vance to gravity    -DVT prophylaxis - SCDs in place, encourage ambulation, SQH tonight  -Meds:   acetaminophen  IVPB .. 1000 milliGRAM(s) IV Intermittent once  acetaminophen  IVPB .. 1000 milliGRAM(s) IV Intermittent once  ALBUTerol    90 MICROgram(s) HFA Inhaler 2 Puff(s) Inhalation every 6 hours PRN  fentaNYL    Injectable 25 MICROGram(s) IV Push every 5 minutes PRN  heparin   Injectable 5000 Unit(s) SubCutaneous every 12 hours  HYDROmorphone  Injectable 0.5 milliGRAM(s) IV Push every 10 minutes PRN  HYDROmorphone  Injectable 1 milliGRAM(s) IV Push every 10 minutes PRN  ibuprofen  Tablet. 600 milliGRAM(s) Oral every 6 hours  influenza   Vaccine 0.5 milliLiter(s) IntraMuscular once  lactated ringers. 1000 milliLiter(s) IV Continuous <Continuous>  metroNIDAZOLE    Tablet 500 milliGRAM(s) Oral every 12 hours  ondansetron    Tablet 8 milliGRAM(s) Oral every 8 hours PRN  ondansetron Injectable 4 milliGRAM(s) IV Push once PRN  oxyCODONE    IR 5 milliGRAM(s) Oral every 4 hours PRN  oxyCODONE    IR 10 milliGRAM(s) Oral every 4 hours PRN  simethicone 80 milliGRAM(s) Chew every 8 hours    -Dispo: stable for transfer from PACU, once meeting all PACU milestones    Courtney Tello PA-C         POST-OP CHECK  PA Note    Allergies    Ceclor (Hives; Rash)  Red food dye (Hives; Rash)    S:  Pt sleeping, easily arousable  Pain controlled. Pt denies N/V, SOB, CP, or palpitations. Denies passing Flatus.  Pt still NPO.  Vance in place    O:   T(C): 36 (10-19-20 @ 11:55), Max: 36 (10-19-20 @ 11:55)  HR: 75 (10-19-20 @ 14:00) (72 - 82)  BP: 134/80 (10-19-20 @ 14:00) (129/84 - 142/84)  RR: 16 (10-19-20 @ 14:00) (14 - 18)  SpO2: 95% (10-19-20 @ 14:00) (95% - 100%)  I&O's Summary    19 Oct 2020 07:01  -  19 Oct 2020 14:29  --------------------------------------------------------  IN: 300 mL / OUT: 1125 mL / NET: -825 mL                         OR           PACU  (I)            1300                  IVF LR@75  (O)            500                  Vance in place - 300 cc the first hour in PACU  EBL           450    CV: RRR  Lungs: CTA B/L  Abd: Hypoactive BS, soft, appropriately tender  Inc: Abdominal binder in place covering clean bandage and midline vertical incision  Ext: SCDs in place, Neg Homans B/L       Labs                   11.5   10.25 )-----------( 217      ( 19 Oct 2020 12:43 )             37.8       10-19    135  |  102  |  13  ----------------------------<  96  4.0   |  22  |  0.92    Ca    8.4      19 Oct 2020 12:43  Mg     1.9     10-19    TPro  7.3  /  Alb  3.6  /  TBili  0.2  /  DBili  x   /  AST  23  /  ALT  8<L>  /  AlkPhos  46  10-19      A/P: 50y Female S/P NICK/BS  with PAST MEDICAL & SURGICAL HISTORY:  Uterine fibroid    Seasonal allergies    LORENA (iron deficiency anemia)    Facial eczema    Asthma    Menstrual spotting    Menorrhagia with irregular cycle    H/O induced   x2    Status post embolization of uterine artery  2018        -Neuro - AAO x 3, Pain controlled currently with dilaudid.  Motrin, IV tylenol and oxycodone ordered as needed for further pain control  -Heme - clinically hemodynamically stable  -CV - asymptomatic, repeat CBC/BMP in am  -Pulm - encourage IS, O2sat 95% on RA  -GI - Diet-  Regular  as Tolerated  - - Vance to gravity    -DVT prophylaxis - SCDs in place, encourage ambulation, SQH tonight  -Meds:   acetaminophen  IVPB .. 1000 milliGRAM(s) IV Intermittent once  acetaminophen  IVPB .. 1000 milliGRAM(s) IV Intermittent once  ALBUTerol    90 MICROgram(s) HFA Inhaler 2 Puff(s) Inhalation every 6 hours PRN  fentaNYL    Injectable 25 MICROGram(s) IV Push every 5 minutes PRN  heparin   Injectable 5000 Unit(s) SubCutaneous every 12 hours  HYDROmorphone  Injectable 0.5 milliGRAM(s) IV Push every 10 minutes PRN  HYDROmorphone  Injectable 1 milliGRAM(s) IV Push every 10 minutes PRN  ibuprofen  Tablet. 600 milliGRAM(s) Oral every 6 hours  influenza   Vaccine 0.5 milliLiter(s) IntraMuscular once  lactated ringers. 1000 milliLiter(s) IV Continuous <Continuous>  metroNIDAZOLE    Tablet 500 milliGRAM(s) Oral every 12 hours  ondansetron    Tablet 8 milliGRAM(s) Oral every 8 hours PRN  ondansetron Injectable 4 milliGRAM(s) IV Push once PRN  oxyCODONE    IR 5 milliGRAM(s) Oral every 4 hours PRN  oxyCODONE    IR 10 milliGRAM(s) Oral every 4 hours PRN  simethicone 80 milliGRAM(s) Chew every 8 hours    -Dispo: stable for transfer from PACU, once meeting all PACU milestones    Courtney Tello PA-C

## 2020-10-20 ENCOUNTER — TRANSCRIPTION ENCOUNTER (OUTPATIENT)
Age: 50
End: 2020-10-20

## 2020-10-20 DIAGNOSIS — Z98.890 OTHER SPECIFIED POSTPROCEDURAL STATES: ICD-10-CM

## 2020-10-20 LAB
ANION GAP SERPL CALC-SCNC: 9 MMOL/L — SIGNIFICANT CHANGE UP (ref 5–17)
BUN SERPL-MCNC: 8 MG/DL — SIGNIFICANT CHANGE UP (ref 7–23)
CALCIUM SERPL-MCNC: 8.2 MG/DL — LOW (ref 8.4–10.5)
CHLORIDE SERPL-SCNC: 103 MMOL/L — SIGNIFICANT CHANGE UP (ref 96–108)
CO2 SERPL-SCNC: 22 MMOL/L — SIGNIFICANT CHANGE UP (ref 22–31)
CREAT SERPL-MCNC: 0.86 MG/DL — SIGNIFICANT CHANGE UP (ref 0.5–1.3)
GLUCOSE SERPL-MCNC: 126 MG/DL — HIGH (ref 70–99)
HCT VFR BLD CALC: 34.4 % — LOW (ref 34.5–45)
HGB BLD-MCNC: 10.7 G/DL — LOW (ref 11.5–15.5)
MCHC RBC-ENTMCNC: 26 PG — LOW (ref 27–34)
MCHC RBC-ENTMCNC: 31.1 GM/DL — LOW (ref 32–36)
MCV RBC AUTO: 83.7 FL — SIGNIFICANT CHANGE UP (ref 80–100)
NRBC # BLD: 0 /100 WBCS — SIGNIFICANT CHANGE UP (ref 0–0)
PLATELET # BLD AUTO: 170 K/UL — SIGNIFICANT CHANGE UP (ref 150–400)
POTASSIUM SERPL-MCNC: 4 MMOL/L — SIGNIFICANT CHANGE UP (ref 3.5–5.3)
POTASSIUM SERPL-SCNC: 4 MMOL/L — SIGNIFICANT CHANGE UP (ref 3.5–5.3)
RBC # BLD: 4.11 M/UL — SIGNIFICANT CHANGE UP (ref 3.8–5.2)
RBC # FLD: 18.1 % — HIGH (ref 10.3–14.5)
SODIUM SERPL-SCNC: 134 MMOL/L — LOW (ref 135–145)
WBC # BLD: 4.8 K/UL — SIGNIFICANT CHANGE UP (ref 3.8–10.5)
WBC # FLD AUTO: 4.8 K/UL — SIGNIFICANT CHANGE UP (ref 3.8–10.5)

## 2020-10-20 RX ORDER — LORATADINE 10 MG/1
1 TABLET ORAL
Qty: 0 | Refills: 0 | DISCHARGE

## 2020-10-20 RX ORDER — OXYCODONE HYDROCHLORIDE 5 MG/1
5 TABLET ORAL
Refills: 0 | Status: DISCONTINUED | OUTPATIENT
Start: 2020-10-20 | End: 2020-10-22

## 2020-10-20 RX ORDER — IBUPROFEN 200 MG
1 TABLET ORAL
Qty: 0 | Refills: 0 | DISCHARGE
Start: 2020-10-20

## 2020-10-20 RX ORDER — METRONIDAZOLE 500 MG
1 TABLET ORAL
Qty: 0 | Refills: 0 | DISCHARGE
Start: 2020-10-20

## 2020-10-20 RX ORDER — OXYCODONE HYDROCHLORIDE 5 MG/1
1 TABLET ORAL
Qty: 6 | Refills: 0
Start: 2020-10-20 | End: 2020-10-21

## 2020-10-20 RX ORDER — ACETAMINOPHEN 500 MG
975 TABLET ORAL EVERY 6 HOURS
Refills: 0 | Status: DISCONTINUED | OUTPATIENT
Start: 2020-10-20 | End: 2020-10-22

## 2020-10-20 RX ORDER — ALBUTEROL 90 UG/1
2 AEROSOL, METERED ORAL
Qty: 0 | Refills: 0 | DISCHARGE

## 2020-10-20 RX ADMIN — Medication 500 MILLIGRAM(S): at 06:00

## 2020-10-20 RX ADMIN — Medication 600 MILLIGRAM(S): at 06:00

## 2020-10-20 RX ADMIN — OXYCODONE HYDROCHLORIDE 5 MILLIGRAM(S): 5 TABLET ORAL at 21:26

## 2020-10-20 RX ADMIN — SIMETHICONE 80 MILLIGRAM(S): 80 TABLET, CHEWABLE ORAL at 06:00

## 2020-10-20 RX ADMIN — Medication 600 MILLIGRAM(S): at 23:57

## 2020-10-20 RX ADMIN — OXYCODONE HYDROCHLORIDE 5 MILLIGRAM(S): 5 TABLET ORAL at 17:58

## 2020-10-20 RX ADMIN — OXYCODONE HYDROCHLORIDE 5 MILLIGRAM(S): 5 TABLET ORAL at 22:00

## 2020-10-20 RX ADMIN — Medication 400 MILLIGRAM(S): at 06:00

## 2020-10-20 RX ADMIN — Medication 975 MILLIGRAM(S): at 23:57

## 2020-10-20 RX ADMIN — SIMETHICONE 80 MILLIGRAM(S): 80 TABLET, CHEWABLE ORAL at 13:04

## 2020-10-20 RX ADMIN — Medication 600 MILLIGRAM(S): at 13:04

## 2020-10-20 RX ADMIN — Medication 400 MILLIGRAM(S): at 10:04

## 2020-10-20 RX ADMIN — OXYCODONE HYDROCHLORIDE 5 MILLIGRAM(S): 5 TABLET ORAL at 18:28

## 2020-10-20 RX ADMIN — SIMETHICONE 80 MILLIGRAM(S): 80 TABLET, CHEWABLE ORAL at 21:28

## 2020-10-20 RX ADMIN — HEPARIN SODIUM 5000 UNIT(S): 5000 INJECTION INTRAVENOUS; SUBCUTANEOUS at 10:04

## 2020-10-20 RX ADMIN — Medication 600 MILLIGRAM(S): at 18:28

## 2020-10-20 RX ADMIN — HYDROMORPHONE HYDROCHLORIDE 30 MILLILITER(S): 2 INJECTION INTRAMUSCULAR; INTRAVENOUS; SUBCUTANEOUS at 07:34

## 2020-10-20 RX ADMIN — Medication 975 MILLIGRAM(S): at 17:00

## 2020-10-20 RX ADMIN — Medication 500 MILLIGRAM(S): at 17:58

## 2020-10-20 RX ADMIN — HEPARIN SODIUM 5000 UNIT(S): 5000 INJECTION INTRAVENOUS; SUBCUTANEOUS at 21:28

## 2020-10-20 RX ADMIN — Medication 600 MILLIGRAM(S): at 17:58

## 2020-10-20 NOTE — PROGRESS NOTE ADULT - ATTENDING COMMENTS
MIGS FELLOW PROGRESS NOTE  I have seen and evaluated the patient, read the above note, and agree with resident assessment and plan with the following additions/exceptions:     Ms. Conway is now POD#1/HD#2 s/p ex-lap, total abdominal hysterectomy, bilateral salpingectomy. Overnight, pt transitioned from oral pain medications to dPCA to optimize pain management. Patient reports pain well controlled this AM while sitting in bed, eating her [place]ym[place] toast and hernandez without issue. PCA 2/3 boluses administered since this morning. Per pt report, she has requested oral Oxycodone around change of shift last night, however, it was delayed and as a result, her pain became very severe...nervous to transition to oral medications.     Objectively, pt is hemodynamically stable and recovering as anticipated. UOP adequate, and blackwell d/c. pending TOV. Exam with soft, NT agnomen without rebound or guarding. Midline vertical incision with dressing in place. AM labs with overall reassuring Hct 34 given EBL 450cc. Plan to continue routine postop management, transition to PO pain medications later this morning, and repeat CBC in AM.     - Regular diet, MLIV  - Transition to standing oral Acetaminophen and Ibuprofen later this morning, Oxycodone 5/10mg PRN moderate/severe pain respectively  - Repeat CBC in AM  - f/u TOV  - OOB, incentive spirometer  - Metronidazole 500mg BID x7days for tx of BV    Dr. Daniel made aware AVNI FELLOW PROGRESS NOTE  I have seen and evaluated the patient, read the above note, and agree with resident assessment and plan with the following additions/exceptions:     Ms. Conway is now POD#1/HD#2 s/p ex-lap, total abdominal hysterectomy, bilateral salpingectomy. Overnight, pt transitioned from oral pain medications to dPCA to optimize pain management. Patient reports pain well controlled this AM while sitting in bed, eating her Italian toast and hernandez without issue. PCA 2/3 boluses administered since this morning. Per pt report, she has requested oral Oxycodone around change of shift last night, however, it was delayed and as a result, her pain became very severe...nervous to transition to oral medications.     Objectively, pt is hemodynamically stable and recovering as anticipated. UOP adequate, and blackwell d/c. pending TOV. Exam with soft, NT agnomen without rebound or guarding. Midline vertical incision with dressing in place. AM labs with overall reassuring Hct 34 given EBL 450cc. Plan to continue routine postop management, transition to PO pain medications later this morning, and repeat CBC in AM.     - Regular diet, MLIV  - Transition to standing oral Acetaminophen and Ibuprofen later this morning, Oxycodone 5/10mg PRN moderate/severe pain respectively  - Repeat CBC in AM  - f/u TOV  - OOB, incentive spirometer  - Metronidazole 500mg BID x7days for tx of BV    Dr. Daniel made aware    Late entry pt seen and examined 10/20 8am. doing well. labs pending. appropriately distended abdomen. to ambulate today. mary pca, mary blackwell advance diet and po ritesh Daniel

## 2020-10-20 NOTE — DISCHARGE NOTE PROVIDER - NSDCCPTREATMENT_GEN_ALL_CORE_FT
PRINCIPAL PROCEDURE  Procedure: Laparoscopic hysterectomy, total, uterus greater than 250 g  Findings and Treatment:       SECONDARY PROCEDURE  Procedure: Salpingectomy, bilateral, total, laparoscopic  Findings and Treatment:      PRINCIPAL PROCEDURE  Procedure: Hysterectomy, total, abdominal  Findings and Treatment:       SECONDARY PROCEDURE  Procedure: Salpingectomy, bilateral, total, laparoscopic  Findings and Treatment:

## 2020-10-20 NOTE — DISCHARGE NOTE PROVIDER - NSDCMRMEDTOKEN_GEN_ALL_CORE_FT
ibuprofen 600 mg oral tablet: 1 tab(s) orally every 6 hours  metroNIDAZOLE 500 mg oral tablet: 1 tab(s) orally every 12 hours  oxyCODONE 5 mg oral capsule: 1 cap(s) orally every 8 hours MDD:3 pills  Tylenol 325 mg oral tablet: 2 tab(s) orally every 6 hours, As Needed   acetaminophen 325 mg oral capsule: 3 cap(s) orally every 6 hours   ibuprofen 600 mg oral tablet: 1 tab(s) orally every 6 hours   metroNIDAZOLE 500 mg oral tablet: 1 tab(s) orally every 12 hours  oxyCODONE 5 mg oral capsule: 1 cap(s) orally every 6 hours MDD:4    acetaminophen 325 mg oral capsule: 3 cap(s) orally every 6 hours   ibuprofen 600 mg oral tablet: 1 tab(s) orally every 6 hours   metroNIDAZOLE 500 mg oral tablet: 1 tab(s) orally every 12 hours  oxyCODONE 5 mg oral tablet: 1 tab(s) orally every 6 hours, As Needed -for severe pain MDD:4   Senna 8.6 mg oral tablet: 1 tab(s) orally once a day (at bedtime)   acetaminophen 325 mg oral capsule: 3 cap(s) orally every 6 hours   Claritin 10 mg oral tablet: 1 tab(s) orally once a day  ibuprofen 600 mg oral tablet: 1 tab(s) orally every 6 hours   metroNIDAZOLE 500 mg oral tablet: 1 tab(s) orally every 12 hours  oxyCODONE 5 mg oral tablet: 1 tab(s) orally every 6 hours, As Needed -for severe pain MDD:4   ProAir HFA 90 mcg/inh inhalation aerosol: 2 puff(s) inhaled every 6 hours, As Needed  Senna 8.6 mg oral tablet: 1 tab(s) orally once a day (at bedtime)

## 2020-10-20 NOTE — PROGRESS NOTE ADULT - SUBJECTIVE AND OBJECTIVE BOX
Gyn Progress Note     HD#2        POD#1      Patient seen and examined at bedside. Patient reports inadequate pain control overnight and now has a PCA with good pain control. She is tolerating regular diet. Denies nausea, vomiting. Not yet OOB. Not passing flatus. Vance in situ. Denies CP, SOB, lightheadedness, dizziness.     Vital Signs Last 24 Hours  T(C): 36.8 (10-20-20 @ 06:05), Max: 36.8 (10-19-20 @ 07:16)  HR: 82 (10-20-20 @ 06:05) (72 - 93)  BP: 98/54 (10-20-20 @ 06:05) (98/54 - 142/84)  RR: 17 (10-20-20 @ 06:05) (14 - 18)  SpO2: 98% (10-20-20 @ 06:05) (95% - 100%)    I&O's Summary  19 Oct 2020 07:01  -  20 Oct 2020 06:51  --------------------------------------------------------  IN: 2280 mL / OUT: 4350 mL / NET: -2070 mL    Physical Exam:  General: NAD, AOx3   CV: RRR, S1, S2, no M/R/G  Lungs: CTAB  Abdomen: Soft, non-tender, non-distended, +BS, midline vertical incision with dressing in place c/d/i  Ext: No pain or swelling      Labs:             11.5   10.25 )-----------( 217      ( 10-19 @ 12:43 )             37.8       MEDICATIONS  (STANDING):  acetaminophen  IVPB .. 1000 milliGRAM(s) IV Intermittent once  acetaminophen  IVPB .. 1000 milliGRAM(s) IV Intermittent once  heparin   Injectable 5000 Unit(s) SubCutaneous every 12 hours  HYDROmorphone PCA (1 mG/mL) 30 milliLiter(s) PCA Continuous PCA Continuous  ibuprofen  Tablet. 600 milliGRAM(s) Oral every 6 hours  influenza   Vaccine 0.5 milliLiter(s) IntraMuscular once  lactated ringers. 1000 milliLiter(s) (75 mL/Hr) IV Continuous <Continuous>  metroNIDAZOLE    Tablet 500 milliGRAM(s) Oral every 12 hours  simethicone 80 milliGRAM(s) Chew every 8 hours    MEDICATIONS  (PRN):  ALBUTerol    90 MICROgram(s) HFA Inhaler 2 Puff(s) Inhalation every 6 hours PRN Bronchospasm  naloxone Injectable 0.1 milliGRAM(s) IV Push every 3 minutes PRN For ANY of the following changes in patient status:  A. RR LESS THAN 10 breaths per minute, B. Oxygen saturation LESS THAN 90%, C. Sedation score of 6  ondansetron    Tablet 8 milliGRAM(s) Oral every 8 hours PRN Nausea and/or Vomiting  ondansetron Injectable 4 milliGRAM(s) IV Push every 6 hours PRN Nausea

## 2020-10-20 NOTE — PROGRESS NOTE ADULT - SUBJECTIVE AND OBJECTIVE BOX
Day 1 of Anesthesia Pain Management Service    SUBJECTIVE: I'm doing ok    Pain Scale Score:	[X] Refer to charted pain scores    THERAPY:    [ ] IV PCA Morphine		[ ] 5 mg/mL	[ ] 1 mg/mL  [X] IV PCA Hydromorphone	[ ] 5 mg/mL	[X] 1 mg/mL  [ ] IV PCA Fentanyl		[ ] 50 micrograms/mL    Demand dose: 0.2 mg     Lockout: 6 minutes   Continuous Rate: 0 mg/hr  4 Hour Limit: 4 mg    MEDICATIONS  (STANDING):  acetaminophen  IVPB .. 1000 milliGRAM(s) IV Intermittent once  heparin   Injectable 5000 Unit(s) SubCutaneous every 12 hours  HYDROmorphone PCA (1 mG/mL) 30 milliLiter(s) PCA Continuous PCA Continuous  ibuprofen  Tablet. 600 milliGRAM(s) Oral every 6 hours  influenza   Vaccine 0.5 milliLiter(s) IntraMuscular once  lactated ringers. 1000 milliLiter(s) (75 mL/Hr) IV Continuous <Continuous>  metroNIDAZOLE    Tablet 500 milliGRAM(s) Oral every 12 hours  simethicone 80 milliGRAM(s) Chew every 8 hours    MEDICATIONS  (PRN):  ALBUTerol    90 MICROgram(s) HFA Inhaler 2 Puff(s) Inhalation every 6 hours PRN Bronchospasm  naloxone Injectable 0.1 milliGRAM(s) IV Push every 3 minutes PRN For ANY of the following changes in patient status:  A. RR LESS THAN 10 breaths per minute, B. Oxygen saturation LESS THAN 90%, C. Sedation score of 6  ondansetron    Tablet 8 milliGRAM(s) Oral every 8 hours PRN Nausea and/or Vomiting  ondansetron Injectable 4 milliGRAM(s) IV Push every 6 hours PRN Nausea      OBJECTIVE:    Sedation Score:	[ X] Alert 	[ ] Drowsy 	[ ] Arousable	[ ] Asleep	[ ] Unresponsive    Side Effects:	[X ] None	[ ] Nausea	[ ] Vomiting	[ ] Pruritus  		[ ] Other:    Vital Signs Last 24 Hrs  T(C): 36.3 (20 Oct 2020 08:15), Max: 36.8 (20 Oct 2020 01:22)  T(F): 97.3 (20 Oct 2020 08:15), Max: 98.2 (20 Oct 2020 01:22)  HR: 75 (20 Oct 2020 08:15) (72 - 93)  BP: 99/61 (20 Oct 2020 08:15) (98/54 - 142/84)  BP(mean): 96 (19 Oct 2020 15:00) (96 - 109)  RR: 17 (20 Oct 2020 08:15) (14 - 18)  SpO2: 98% (20 Oct 2020 08:15) (95% - 100%)    ASSESSMENT/ PLAN    Therapy to  be:               [X] Continued   [ ] Discontinued   [ ] Changed to PRN Analgesics    Documentation and Verification of current medications:   [X] Done	[ ] Not done, not eligible    Comments: OOB in chair. Using 0-1x/hr. Reeducated to use. Day 1 of Anesthesia Pain Management Service    SUBJECTIVE: I'm doing ok    Pain Scale Score:	[X] Refer to charted pain scores    THERAPY:    [ ] IV PCA Morphine		[ ] 5 mg/mL	[ ] 1 mg/mL  [X] IV PCA Hydromorphone	[ ] 5 mg/mL	[X] 1 mg/mL  [ ] IV PCA Fentanyl		[ ] 50 micrograms/mL    Demand dose: 0.2 mg     Lockout: 6 minutes   Continuous Rate: 0 mg/hr  4 Hour Limit: 4 mg    MEDICATIONS  (STANDING):  acetaminophen  IVPB .. 1000 milliGRAM(s) IV Intermittent once  heparin   Injectable 5000 Unit(s) SubCutaneous every 12 hours  HYDROmorphone PCA (1 mG/mL) 30 milliLiter(s) PCA Continuous PCA Continuous  ibuprofen  Tablet. 600 milliGRAM(s) Oral every 6 hours  influenza   Vaccine 0.5 milliLiter(s) IntraMuscular once  lactated ringers. 1000 milliLiter(s) (75 mL/Hr) IV Continuous <Continuous>  metroNIDAZOLE    Tablet 500 milliGRAM(s) Oral every 12 hours  simethicone 80 milliGRAM(s) Chew every 8 hours    MEDICATIONS  (PRN):  ALBUTerol    90 MICROgram(s) HFA Inhaler 2 Puff(s) Inhalation every 6 hours PRN Bronchospasm  naloxone Injectable 0.1 milliGRAM(s) IV Push every 3 minutes PRN For ANY of the following changes in patient status:  A. RR LESS THAN 10 breaths per minute, B. Oxygen saturation LESS THAN 90%, C. Sedation score of 6  ondansetron    Tablet 8 milliGRAM(s) Oral every 8 hours PRN Nausea and/or Vomiting  ondansetron Injectable 4 milliGRAM(s) IV Push every 6 hours PRN Nausea      OBJECTIVE:    Sedation Score:	[ X] Alert 	[ ] Drowsy 	[ ] Arousable	[ ] Asleep	[ ] Unresponsive    Side Effects:	[X ] None	[ ] Nausea	[ ] Vomiting	[ ] Pruritus  		[ ] Other:    Vital Signs Last 24 Hrs  T(C): 36.3 (20 Oct 2020 08:15), Max: 36.8 (20 Oct 2020 01:22)  T(F): 97.3 (20 Oct 2020 08:15), Max: 98.2 (20 Oct 2020 01:22)  HR: 75 (20 Oct 2020 08:15) (72 - 93)  BP: 99/61 (20 Oct 2020 08:15) (98/54 - 142/84)  BP(mean): 96 (19 Oct 2020 15:00) (96 - 109)  RR: 17 (20 Oct 2020 08:15) (14 - 18)  SpO2: 98% (20 Oct 2020 08:15) (95% - 100%)    ASSESSMENT/ PLAN    Therapy to  be:               [X] Continued   [ ] Discontinued   [ ] Changed to PRN Analgesics    Documentation and Verification of current medications:   [X] Done	[ ] Not done, not eligible    Comments: OOB in chair. Using 0-1x/hr. Reeducated to use. Plan to transition to prn analgesics once tolerating po

## 2020-10-20 NOTE — DISCHARGE NOTE PROVIDER - CARE PROVIDER_API CALL
Leigha Daniel  OBSTETRICS AND GYNECOLOGY  [address] [place] [address]  Phone: [phone]  Fax: [phone]  Established Patient  Follow Up Time:

## 2020-10-20 NOTE — PROGRESS NOTE ADULT - ASSESSMENT
51 yo POD#1 s/p NICK, BS. Patient currently with adequate pain control on PCA and recovering appropriately postoperatively.

## 2020-10-20 NOTE — DISCHARGE NOTE PROVIDER - HOSPITAL COURSE
50y yo F s/p total abdominal hysterectomy and bilateral salpingectomy on 10/19/2020. See operative report for full details. EBL:450. Hct:37.8    POD #1, pt was advanced to a regular diet, blackwell was discontinued and pt was able to void spontaneously.  POD#2, pt was discharged in stable condition, ambulating, tolerating po and voiding spontaneously. Pt to have close follow-up w/ Dr. Daniel in clinic. 50y yo s/p total abdominal hysterectomy and bilateral salpingectomy on 10/19/2020. See operative report for details. EBL:450. Patient was transferred to floor and started on PCA for pain control.   POD #1, pt was advanced to a regular diet, blackwell was discontinued and pt was able to void spontaneously. PCA was discontinued and patient was transitioned to PO pain meds with adequate pain control. POD#2, pt was discharged home in stable condition with adequate pain control, ambulating, passing flatus, tolerating regular diet and voiding spontaneously. 50y yo s/p total abdominal hysterectomy and bilateral salpingectomy on 10/19/2020. See operative report for details. EBL:450. Patient was transferred to floor and started on PCA for pain control.   POD #1, pt was advanced to a regular diet, blackwell was discontinued and pt was able to void spontaneously. PCA was discontinued and patient was transitioned to PO pain meds with adequate pain control. POD#3, pt was discharged home in stable condition with adequate pain control, ambulating, passing flatus, tolerating regular diet and voiding spontaneously.

## 2020-10-20 NOTE — PROGRESS NOTE ADULT - PROBLEM SELECTOR PLAN 1
CV: Hemodynamically stable, f/u AM CBC   Pulm: O2 saturation wnl in RA. Incentive spirometry use encouraged.   GI: Continue regular diet   : adequate UOP, 2000cc/shift. D/c Vance after AM labs.   Heme: HSQ, venodynes for DVT prophylaxis. Encouraged OOB, ambulation   Neuro: Continue PCA for pain. Transition to PO pain meds when PCA discontinued.   FEN: LR@75. F/u AM BMP. Replete electrolytes PRN   Dispo: continue current management CV: Hemodynamically stable, f/u AM CBC   Pulm: O2 saturation wnl in RA. Incentive spirometry use encouraged.   GI: Continue regular diet   : adequate UOP, 2000cc/shift. D/c Vance.   Heme: HSQ, venodynes for DVT prophylaxis. Encouraged OOB, ambulation   Neuro: Continue PCA for pain. Transition to PO pain meds when PCA discontinued.   FEN: LR@75. F/u AM BMP. Replete electrolytes PRN   Dispo: continue current management CV: Hemodynamically stable, f/u AM CBC   Pulm: O2 saturation wnl in RA. Incentive spirometry use encouraged.   GI: Continue regular diet   : adequate UOP, 2000cc/shift. D/c Vance.   Heme: HSQ, venodynes for DVT prophylaxis. Encouraged OOB, ambulation   Neuro: Continue PCA for pain. Transition to PO pain meds when PCA discontinued.   FEN: LR@75. F/u AM BMP. Replete electrolytes PRN   ID: Afebrile. Flagyl x7 days for BV treatment (10/19-)  Dispo: continue current management

## 2020-10-21 LAB
BASOPHILS # BLD AUTO: 0.02 K/UL — SIGNIFICANT CHANGE UP (ref 0–0.2)
BASOPHILS NFR BLD AUTO: 0.4 % — SIGNIFICANT CHANGE UP (ref 0–2)
EOSINOPHIL # BLD AUTO: 0.17 K/UL — SIGNIFICANT CHANGE UP (ref 0–0.5)
EOSINOPHIL NFR BLD AUTO: 3.6 % — SIGNIFICANT CHANGE UP (ref 0–6)
HCT VFR BLD CALC: 33.9 % — LOW (ref 34.5–45)
HGB BLD-MCNC: 10.2 G/DL — LOW (ref 11.5–15.5)
IMM GRANULOCYTES NFR BLD AUTO: 0.4 % — SIGNIFICANT CHANGE UP (ref 0–1.5)
LYMPHOCYTES # BLD AUTO: 0.89 K/UL — LOW (ref 1–3.3)
LYMPHOCYTES # BLD AUTO: 18.9 % — SIGNIFICANT CHANGE UP (ref 13–44)
MCHC RBC-ENTMCNC: 25.4 PG — LOW (ref 27–34)
MCHC RBC-ENTMCNC: 30.1 GM/DL — LOW (ref 32–36)
MCV RBC AUTO: 84.3 FL — SIGNIFICANT CHANGE UP (ref 80–100)
MONOCYTES # BLD AUTO: 0.32 K/UL — SIGNIFICANT CHANGE UP (ref 0–0.9)
MONOCYTES NFR BLD AUTO: 6.8 % — SIGNIFICANT CHANGE UP (ref 2–14)
NEUTROPHILS # BLD AUTO: 3.29 K/UL — SIGNIFICANT CHANGE UP (ref 1.8–7.4)
NEUTROPHILS NFR BLD AUTO: 69.9 % — SIGNIFICANT CHANGE UP (ref 43–77)
NRBC # BLD: 0 /100 WBCS — SIGNIFICANT CHANGE UP (ref 0–0)
PLATELET # BLD AUTO: 215 K/UL — SIGNIFICANT CHANGE UP (ref 150–400)
RBC # BLD: 4.02 M/UL — SIGNIFICANT CHANGE UP (ref 3.8–5.2)
RBC # FLD: 17.7 % — HIGH (ref 10.3–14.5)
WBC # BLD: 4.71 K/UL — SIGNIFICANT CHANGE UP (ref 3.8–10.5)
WBC # FLD AUTO: 4.71 K/UL — SIGNIFICANT CHANGE UP (ref 3.8–10.5)

## 2020-10-21 RX ORDER — ONDANSETRON 8 MG/1
4 TABLET, FILM COATED ORAL ONCE
Refills: 0 | Status: COMPLETED | OUTPATIENT
Start: 2020-10-21 | End: 2020-10-21

## 2020-10-21 RX ORDER — METRONIDAZOLE 500 MG
1 TABLET ORAL
Qty: 10 | Refills: 0
Start: 2020-10-21 | End: 2020-10-25

## 2020-10-21 RX ORDER — IBUPROFEN 200 MG
1 TABLET ORAL
Qty: 30 | Refills: 0
Start: 2020-10-21

## 2020-10-21 RX ORDER — OXYCODONE HYDROCHLORIDE 5 MG/1
1 TABLET ORAL
Qty: 10 | Refills: 0
Start: 2020-10-21

## 2020-10-21 RX ORDER — OXYCODONE HYDROCHLORIDE 5 MG/1
5 TABLET ORAL ONCE
Refills: 0 | Status: DISCONTINUED | OUTPATIENT
Start: 2020-10-21 | End: 2020-10-21

## 2020-10-21 RX ORDER — ACETAMINOPHEN 500 MG
3 TABLET ORAL
Qty: 30 | Refills: 0
Start: 2020-10-21

## 2020-10-21 RX ORDER — ACETAMINOPHEN 500 MG
2 TABLET ORAL
Qty: 0 | Refills: 0 | DISCHARGE

## 2020-10-21 RX ORDER — OXYCODONE HYDROCHLORIDE 5 MG/1
1 TABLET ORAL
Qty: 12 | Refills: 0
Start: 2020-10-21

## 2020-10-21 RX ADMIN — SIMETHICONE 80 MILLIGRAM(S): 80 TABLET, CHEWABLE ORAL at 13:29

## 2020-10-21 RX ADMIN — HEPARIN SODIUM 5000 UNIT(S): 5000 INJECTION INTRAVENOUS; SUBCUTANEOUS at 09:28

## 2020-10-21 RX ADMIN — ONDANSETRON 4 MILLIGRAM(S): 8 TABLET, FILM COATED ORAL at 05:23

## 2020-10-21 RX ADMIN — Medication 975 MILLIGRAM(S): at 17:47

## 2020-10-21 RX ADMIN — Medication 975 MILLIGRAM(S): at 05:23

## 2020-10-21 RX ADMIN — Medication 600 MILLIGRAM(S): at 21:39

## 2020-10-21 RX ADMIN — Medication 500 MILLIGRAM(S): at 17:47

## 2020-10-21 RX ADMIN — Medication 600 MILLIGRAM(S): at 22:30

## 2020-10-21 RX ADMIN — Medication 975 MILLIGRAM(S): at 11:00

## 2020-10-21 RX ADMIN — OXYCODONE HYDROCHLORIDE 5 MILLIGRAM(S): 5 TABLET ORAL at 13:26

## 2020-10-21 RX ADMIN — SIMETHICONE 80 MILLIGRAM(S): 80 TABLET, CHEWABLE ORAL at 05:24

## 2020-10-21 RX ADMIN — OXYCODONE HYDROCHLORIDE 5 MILLIGRAM(S): 5 TABLET ORAL at 10:51

## 2020-10-21 RX ADMIN — HEPARIN SODIUM 5000 UNIT(S): 5000 INJECTION INTRAVENOUS; SUBCUTANEOUS at 21:39

## 2020-10-21 RX ADMIN — OXYCODONE HYDROCHLORIDE 5 MILLIGRAM(S): 5 TABLET ORAL at 00:33

## 2020-10-21 RX ADMIN — Medication 600 MILLIGRAM(S): at 09:27

## 2020-10-21 RX ADMIN — SIMETHICONE 80 MILLIGRAM(S): 80 TABLET, CHEWABLE ORAL at 21:39

## 2020-10-21 RX ADMIN — OXYCODONE HYDROCHLORIDE 5 MILLIGRAM(S): 5 TABLET ORAL at 05:24

## 2020-10-21 RX ADMIN — OXYCODONE HYDROCHLORIDE 5 MILLIGRAM(S): 5 TABLET ORAL at 21:39

## 2020-10-21 RX ADMIN — Medication 975 MILLIGRAM(S): at 23:26

## 2020-10-21 RX ADMIN — OXYCODONE HYDROCHLORIDE 5 MILLIGRAM(S): 5 TABLET ORAL at 17:47

## 2020-10-21 RX ADMIN — OXYCODONE HYDROCHLORIDE 5 MILLIGRAM(S): 5 TABLET ORAL at 05:55

## 2020-10-21 RX ADMIN — Medication 500 MILLIGRAM(S): at 05:23

## 2020-10-21 RX ADMIN — OXYCODONE HYDROCHLORIDE 5 MILLIGRAM(S): 5 TABLET ORAL at 11:21

## 2020-10-21 RX ADMIN — OXYCODONE HYDROCHLORIDE 5 MILLIGRAM(S): 5 TABLET ORAL at 13:56

## 2020-10-21 RX ADMIN — OXYCODONE HYDROCHLORIDE 5 MILLIGRAM(S): 5 TABLET ORAL at 18:17

## 2020-10-21 RX ADMIN — OXYCODONE HYDROCHLORIDE 5 MILLIGRAM(S): 5 TABLET ORAL at 22:30

## 2020-10-21 RX ADMIN — Medication 600 MILLIGRAM(S): at 16:26

## 2020-10-21 NOTE — PROGRESS NOTE ADULT - SUBJECTIVE AND OBJECTIVE BOX
R2 Progress Note  HD#3   POD#2    Patient seen and examined at bedside, no acute overnight events. No acute complaints, pain well controlled. Patient is ambulating, passing flatus, voiding spontaneously, and tolerating regular diet. Denies CP, SOB, N/V, fevers, and chills.    Vital Signs Last 24 Hours  T(C): 36.4 (10-21-20 @ 05:29), Max: 36.7 (10-20-20 @ 16:56)  HR: 82 (10-21-20 @ 05:29) (68 - 82)  BP: 106/73 (10-21-20 @ 05:29) (99/61 - 116/71)  RR: 18 (10-21-20 @ 05:29) (17 - 18)  SpO2: 96% (10-21-20 @ 05:29) (95% - 99%)    I&O's Detail    19 Oct 2020 07:01  -  20 Oct 2020 07:00  --------------------------------------------------------  IN:    IV PiggyBack: 200 mL    Lactated Ringers: 1500 mL    Oral Fluid: 580 mL  Total IN: 2280 mL    OUT:    Estimated Blood Loss (mL): 450 mL    Indwelling Catheter - Urethral (mL): 4350 mL  Total OUT: 4800 mL    Total NET: -2520 mL      20 Oct 2020 07:01  -  21 Oct 2020 06:56  --------------------------------------------------------  IN:    IV PiggyBack: 100 mL    Lactated Ringers: 450 mL    Oral Fluid: 1000 mL  Total IN: 1550 mL    OUT:    Indwelling Catheter - Urethral (mL): 400 mL    Voided (mL): 1400 mL  Total OUT: 1800 mL    Total NET: -250 mL    Physical Exam:  General: NAD  CV: NR, RR, S1, S2, no M/R/G  Lungs: CTA-B  Abdomen: Soft, appropriately tender, mildly-distended, +BS  Incision: midline vertical incision, clean, dry, intact, pressure dressing removed  Ext: No pain or swelling    Labs:             10.7<L>  4.80  )-----------( 170      ( 10-20 @ 06:44 )             34.4<L>               11.5   10.25 )-----------( 217      ( 10-19 @ 12:43 )             37.8     MEDICATIONS  (STANDING):  acetaminophen   Tablet .. 975 milliGRAM(s) Oral every 6 hours  heparin   Injectable 5000 Unit(s) SubCutaneous every 12 hours  ibuprofen  Tablet. 600 milliGRAM(s) Oral every 6 hours  influenza   Vaccine 0.5 milliLiter(s) IntraMuscular once  metroNIDAZOLE    Tablet 500 milliGRAM(s) Oral every 12 hours  simethicone 80 milliGRAM(s) Chew every 8 hours    MEDICATIONS  (PRN):  ALBUTerol    90 MICROgram(s) HFA Inhaler 2 Puff(s) Inhalation every 6 hours PRN Bronchospasm  ondansetron    Tablet 8 milliGRAM(s) Oral every 8 hours PRN Nausea and/or Vomiting  oxyCODONE    IR 5 milliGRAM(s) Oral every 3 hours PRN Moderate Pain (4 - 6)

## 2020-10-21 NOTE — PROGRESS NOTE ADULT - PROBLEM SELECTOR PLAN 1
Plan: CV: Hemodynamically stable, AM CBC stable   Pulm: O2 saturation wnl in RA. Incentive spirometry use encouraged.   GI: Continue regular diet   : adequate UOP, 2000cc/shift. D/c Vance.   Heme: HSQ, venodynes for DVT prophylaxis. Encouraged OOB, ambulation   Neuro: continue w/ PO pain medication, pain well controlled s/p PCA   FEN: SLIV, UOP adequate  ID: Afebrile. Flagyl x7 days for BV treatment (10/19-)  Dispo: discharge planning    Jyoti France PGY2 Plan: CV: Hemodynamically stable, AM CBC stable   Pulm: O2 saturation wnl in RA. Incentive spirometry use encouraged.   GI: Continue regular diet   : adequate UOP, voiding spontaneously, 2000cc/shift.   Heme: HSQ, venodynes for DVT prophylaxis. Encouraged OOB, ambulation   Neuro: continue w/ PO pain medication, pain well controlled s/p PCA   FEN: SLIV, UOP adequate  ID: Afebrile. Flagyl x7 days for BV treatment (10/19-)  Dispo: discharge planning    Jyoti France PGY2

## 2020-10-21 NOTE — PROGRESS NOTE ADULT - SUBJECTIVE AND OBJECTIVE BOX
Afternoon Progress Note, POD#2    Patient seen at bedside after lunch. She is experiencing increased abdominal pain, which she believes is gas pain. She has been taking her pain medication as scheduled and ambulating well possible, though she feels limited by pain. She has not yet passe flatus or had a bowel movement. She denies CP, SOB, N/V.     Vitals:    T(C): 36.3 (10-21-20 @ 13:24), Max: 36.7 (10-20-20 @ 16:56)  HR: 80 (10-21-20 @ 13:24) (70 - 82)  BP: 105/67 (10-21-20 @ 13:24) (102/60 - 116/71)  RR: 18 (10-21-20 @ 13:24) (17 - 18)  SpO2: 98% (10-21-20 @ 13:24) (95% - 99%)    Physical Exam:   General: pt sitting in chair, appears to be in pain   CV: RRR  Resp: No increased WOB   Abd: softly distended, +BS, nontender, no guarding or rebound   Incision: midline vertical incision CDI, nontender, no erythema noted     Assessment:   49 yo POD#2 s/p NICK, BS, meeting appropriate milestones but with increased abdominal pain.     Plan:   - Breakthrough oxycodone, 5mg   - Continue scheduled PO analgesia   - Increase ambulation as tolerated  - Reassess pain after breakthrough dose     SRIDHAR Lau, PGY1   D/w Dr. Pedro

## 2020-10-21 NOTE — PROGRESS NOTE ADULT - ATTENDING COMMENTS
MIGS FELLOW PROGRESS NOTE  I have seen and evaluated the patient, read the above note, and agree with resident assessment and plan with the following additions/exceptions:     Ms. Conway is now POD#2/HD#3 s/p ex-lap, total abdominal hysterectomy, bilateral salpingectomy. This morning, pt is sleeping comfortably and reports pain well controlled. Ambulating, voiding, and passing small amounts of flatus. Tolerating her regular diet without emesis or nausea.     Objectively, pt is hemodynamically stable and recovering as anticipated. Exam with entirely benign abdomen with midline vertical incision c/d/i with steris in place.  Hct stable this AM. Patient is meeting all postoperative milestones at this time. Will re-evaluate for possible discharge later this afternoon.     Dr. Daniel made aware MIGS FELLOW PROGRESS NOTE  I have seen and evaluated the patient, read the above note, and agree with resident assessment and plan with the following additions/exceptions:     Ms. Conway is now POD#2/HD#3 s/p ex-lap, total abdominal hysterectomy, bilateral salpingectomy. This morning, pt is sleeping comfortably and reports pain well controlled. Ambulating, voiding, and passing small amounts of flatus. Tolerating her regular diet without emesis or nausea.     Objectively, pt is hemodynamically stable and recovering as anticipated. Exam with entirely benign abdomen with midline vertical incision c/d/i with steris in place.  Hct stable this AM. Patient is meeting all postoperative milestones at this time. Will re-evaluate for possible discharge later this afternoon.     GYN Attending Note    pt seen and examined. doing well overall. still using walker to ambulate. less dizziness with ambulation today. stable labs. dc home tomorrow.    ELI Daniel made aware

## 2020-10-21 NOTE — PROGRESS NOTE ADULT - ASSESSMENT
51 yo POD#1 s/p KIKA ROMERO. Patient currently with adequate pain control and meeting all postoperative milestones.

## 2020-10-22 ENCOUNTER — TRANSCRIPTION ENCOUNTER (OUTPATIENT)
Age: 50
End: 2020-10-22

## 2020-10-22 VITALS
RESPIRATION RATE: 18 BRPM | TEMPERATURE: 98 F | SYSTOLIC BLOOD PRESSURE: 143 MMHG | DIASTOLIC BLOOD PRESSURE: 87 MMHG | OXYGEN SATURATION: 98 % | HEART RATE: 70 BPM

## 2020-10-22 PROCEDURE — 88307 TISSUE EXAM BY PATHOLOGIST: CPT

## 2020-10-22 PROCEDURE — 85027 COMPLETE CBC AUTOMATED: CPT

## 2020-10-22 PROCEDURE — 85025 COMPLETE CBC W/AUTO DIFF WBC: CPT

## 2020-10-22 PROCEDURE — 81025 URINE PREGNANCY TEST: CPT

## 2020-10-22 PROCEDURE — C1889: CPT

## 2020-10-22 PROCEDURE — 80053 COMPREHEN METABOLIC PANEL: CPT

## 2020-10-22 PROCEDURE — C9399: CPT

## 2020-10-22 PROCEDURE — 88302 TISSUE EXAM BY PATHOLOGIST: CPT

## 2020-10-22 PROCEDURE — 82962 GLUCOSE BLOOD TEST: CPT

## 2020-10-22 PROCEDURE — 80048 BASIC METABOLIC PNL TOTAL CA: CPT

## 2020-10-22 PROCEDURE — 83735 ASSAY OF MAGNESIUM: CPT

## 2020-10-22 PROCEDURE — 88305 TISSUE EXAM BY PATHOLOGIST: CPT

## 2020-10-22 RX ORDER — ALBUTEROL 90 UG/1
2 AEROSOL, METERED ORAL
Qty: 0 | Refills: 0 | DISCHARGE

## 2020-10-22 RX ORDER — SENNA PLUS 8.6 MG/1
1 TABLET ORAL
Qty: 0 | Refills: 0 | DISCHARGE

## 2020-10-22 RX ORDER — LORATADINE 10 MG/1
1 TABLET ORAL
Qty: 0 | Refills: 0 | DISCHARGE

## 2020-10-22 RX ADMIN — OXYCODONE HYDROCHLORIDE 5 MILLIGRAM(S): 5 TABLET ORAL at 09:38

## 2020-10-22 RX ADMIN — Medication 975 MILLIGRAM(S): at 11:43

## 2020-10-22 RX ADMIN — OXYCODONE HYDROCHLORIDE 5 MILLIGRAM(S): 5 TABLET ORAL at 09:08

## 2020-10-22 RX ADMIN — Medication 500 MILLIGRAM(S): at 05:59

## 2020-10-22 RX ADMIN — SIMETHICONE 80 MILLIGRAM(S): 80 TABLET, CHEWABLE ORAL at 05:59

## 2020-10-22 RX ADMIN — OXYCODONE HYDROCHLORIDE 5 MILLIGRAM(S): 5 TABLET ORAL at 03:40

## 2020-10-22 RX ADMIN — OXYCODONE HYDROCHLORIDE 5 MILLIGRAM(S): 5 TABLET ORAL at 12:14

## 2020-10-22 RX ADMIN — Medication 600 MILLIGRAM(S): at 03:06

## 2020-10-22 RX ADMIN — OXYCODONE HYDROCHLORIDE 5 MILLIGRAM(S): 5 TABLET ORAL at 11:44

## 2020-10-22 RX ADMIN — HEPARIN SODIUM 5000 UNIT(S): 5000 INJECTION INTRAVENOUS; SUBCUTANEOUS at 09:08

## 2020-10-22 RX ADMIN — Medication 600 MILLIGRAM(S): at 09:08

## 2020-10-22 RX ADMIN — Medication 600 MILLIGRAM(S): at 03:40

## 2020-10-22 RX ADMIN — OXYCODONE HYDROCHLORIDE 5 MILLIGRAM(S): 5 TABLET ORAL at 03:06

## 2020-10-22 RX ADMIN — Medication 975 MILLIGRAM(S): at 05:59

## 2020-10-22 NOTE — PROGRESS NOTE ADULT - PROBLEM SELECTOR PLAN 1
Plan: CV: Hemodynamically stable.  Pulm: O2 saturation wnl in RA. Incentive spirometry use encouraged.   GI: Continue regular diet   : adequate UOP, voiding spontaneously, 1000cc/shift.   Heme: HSQ, venodynes for DVT prophylaxis. Encouraged OOB, ambulation   Neuro: continue w/ PO pain medication, pain well controlled  FEN: SLIV, UOP adequate  ID: Afebrile. Flagyl x7 days for BV treatment (10/19-)  Dispo: discharge planning    TLal PGY4

## 2020-10-22 NOTE — PROGRESS NOTE ADULT - ATTENDING COMMENTS
MIGS FELLOW PROGRESS NOTE  I have seen and evaluated the patient, read the above note, and agree with resident assessment and plan with the following additions/exceptions:     Ms. Conway is now POD#3/HD#4 s/p ex-lap, total abdominal hysterectomy, bilateral salpingectomy. This morning, pt sitting up in chair. Pain well controlled. Passing more flatus. Continues to meet all postop milestones at this time. She is hemodynamically stable for safe discharge home.     - Postop precautions and instructions reviewed with patient  - All questions answered    Dw Dr Daniel MIGS FELLOW PROGRESS NOTE  I have seen and evaluated the patient, read the above note, and agree with resident assessment and plan with the following additions/exceptions:     Ms. Conway is now POD#3/HD#4 s/p ex-lap, total abdominal hysterectomy, bilateral salpingectomy. This morning, pt sitting up in chair. Pain well controlled. Passing more flatus. Continues to meet all postop milestones at this time. She is hemodynamically stable for safe discharge home.     - Postop precautions and instructions reviewed with patient  - All questions answered    D/w Dr Daniel      GYN Attending Note    pt seen and examined. agree with above. doing well this morning. stable for discharge today.     ELI Daniel

## 2020-10-22 NOTE — DISCHARGE NOTE NURSING/CASE MANAGEMENT/SOCIAL WORK - NSDCPNINST_GEN_ALL_CORE
If unable to tolerate diet, nausea, vomiting, fever above 100.3, chills, or an increase pain, notify provider or return to ER.

## 2020-10-22 NOTE — PROGRESS NOTE ADULT - ASSESSMENT
51 yo POD#3 s/p NICK, KIKA. Patient currently with adequate pain control and meeting all postoperative milestones.

## 2020-10-22 NOTE — DISCHARGE NOTE NURSING/CASE MANAGEMENT/SOCIAL WORK - PATIENT PORTAL LINK FT
You can access the FollowMyHealth Patient Portal offered by [facility] by registering at the following website: [email or web address]. By joining [facility]’s FollowMyHealth portal, you will also be able to view your health information using other applications (apps) compatible with our system.

## 2020-10-22 NOTE — PROGRESS NOTE ADULT - SUBJECTIVE AND OBJECTIVE BOX
R4 Progress Note  HD#4   POD#3    Patient seen and examined at bedside, no acute overnight events.   No acute complaints, pain well controlled. Patient is ambulating, voiding spontaneously, and tolerating regular diet. Denies CP, SOB, N/V, fevers, and chills. Pt reports small passage of flatus yesterday.  Reports she walked 2 laps around unit yesterday without issue.     Vital Signs Last 24 Hrs  T(C): 37.3 (22 Oct 2020 05:57), Max: 37.3 (22 Oct 2020 05:57)  T(F): 99.2 (22 Oct 2020 05:57), Max: 99.2 (22 Oct 2020 05:57)  HR: 70 (22 Oct 2020 05:57) (67 - 80)  BP: 134/77 (22 Oct 2020 05:57) (105/67 - 134/77)  BP(mean): --  RR: 15 (22 Oct 2020 05:57) (15 - 18)  SpO2: 98% (22 Oct 2020 05:57) (95% - 98%)    Physical Exam:  General: NAD  CV: NR, RR, S1, S2, no M/R/G  Lungs: CTA-BL  Abdomen: Soft, appropriately tender, mildly-distended, less distended than yesterday +BSx4 quadrants   Incision: midline vertical incision, clean, dry, intact  Ext: No pain or swelling    Labs:                        10.2   4.71  )-----------( 215      ( 21 Oct 2020 06:34 )             33.9                10.7<L>  4.80  )-----------( 170      ( 10-20 @ 06:44 )             34.4<L>               11.5   10.25 )-----------( 217      ( 10-19 @ 12:43 )             37.8     MEDICATIONS  (STANDING):  acetaminophen   Tablet .. 975 milliGRAM(s) Oral every 6 hours  heparin   Injectable 5000 Unit(s) SubCutaneous every 12 hours  ibuprofen  Tablet. 600 milliGRAM(s) Oral every 6 hours  influenza   Vaccine 0.5 milliLiter(s) IntraMuscular once  metroNIDAZOLE    Tablet 500 milliGRAM(s) Oral every 12 hours  simethicone 80 milliGRAM(s) Chew every 8 hours    MEDICATIONS  (PRN):  ALBUTerol    90 MICROgram(s) HFA Inhaler 2 Puff(s) Inhalation every 6 hours PRN Bronchospasm  ondansetron    Tablet 8 milliGRAM(s) Oral every 8 hours PRN Nausea and/or Vomiting  oxyCODONE    IR 5 milliGRAM(s) Oral every 3 hours PRN Moderate Pain (4 - 6)

## 2020-11-19 ENCOUNTER — APPOINTMENT (OUTPATIENT)
Dept: OBGYN | Facility: CLINIC | Age: 50
End: 2020-11-19

## 2020-11-23 PROBLEM — N92.1 EXCESSIVE AND FREQUENT MENSTRUATION WITH IRREGULAR CYCLE: Chronic | Status: ACTIVE | Noted: 2020-10-12

## 2020-11-23 PROBLEM — J30.2 OTHER SEASONAL ALLERGIC RHINITIS: Chronic | Status: ACTIVE | Noted: 2020-10-12

## 2020-11-23 PROBLEM — N92.0 EXCESSIVE AND FREQUENT MENSTRUATION WITH REGULAR CYCLE: Chronic | Status: ACTIVE | Noted: 2020-10-12

## 2020-11-23 PROBLEM — L30.9 DERMATITIS, UNSPECIFIED: Chronic | Status: ACTIVE | Noted: 2020-10-12

## 2020-11-23 PROBLEM — J45.909 UNSPECIFIED ASTHMA, UNCOMPLICATED: Chronic | Status: ACTIVE | Noted: 2020-10-12

## 2020-11-23 PROBLEM — D25.9 LEIOMYOMA OF UTERUS, UNSPECIFIED: Chronic | Status: ACTIVE | Noted: 2020-10-12

## 2020-11-23 PROBLEM — D50.9 IRON DEFICIENCY ANEMIA, UNSPECIFIED: Chronic | Status: ACTIVE | Noted: 2020-10-12

## 2020-12-09 ENCOUNTER — APPOINTMENT (OUTPATIENT)
Dept: OBGYN | Facility: CLINIC | Age: 50
End: 2020-12-09
Payer: COMMERCIAL

## 2020-12-09 PROCEDURE — 99024 POSTOP FOLLOW-UP VISIT: CPT

## 2021-06-30 ENCOUNTER — APPOINTMENT (OUTPATIENT)
Dept: OBGYN | Facility: CLINIC | Age: 51
End: 2021-06-30
Payer: COMMERCIAL

## 2021-06-30 PROCEDURE — 99072 ADDL SUPL MATRL&STAF TM PHE: CPT

## 2021-06-30 PROCEDURE — 99396 PREV VISIT EST AGE 40-64: CPT

## 2021-07-29 LAB
APTT BLD: 28.1 SEC
FERRITIN SERPL-MCNC: 77 NG/ML
FOLATE SERPL-MCNC: 9.4 NG/ML
HAPTOGLOB SERPL-MCNC: 188 MG/DL
INR PPP: 1.02 RATIO
IRON SATN MFR SERPL: 46 %
IRON SERPL-MCNC: 122 UG/DL
LDH SERPL-CCNC: 160 U/L
PT BLD: 12.1 SEC
TIBC SERPL-MCNC: 266 UG/DL
UIBC SERPL-MCNC: 144 UG/DL
VIT B12 SERPL-MCNC: 720 PG/ML
VWF AG PPP IA-ACNC: 281 %
VWF MULTIMERS PPP IA-ACNC: NORMAL
VWF:RCO ACT/NOR PPP PL AGG: 228 %

## 2021-07-30 NOTE — ASSESSMENT
[FreeTextEntry1] : This is a 50 year old female with a mild normocytic anemia, likely due to blood loss/iron deficiency from menorrhagia/fibroids. \par She is planned for a hysterectomy.  \par Her CBC today reveals a WBC of 3.53 H/H 11.4/38.0 Plt 248.\par With a hemoglobin >10, no need for any intervention at this time- no transfusion. \par Will check her iron profile, B12/folate levels to see if she requires any supplementation.  \par Would also check her von Willebrand's panel to ensure no other bleeding diathesis as a cause for her menorrhagia.  \par She will follow up as needed.

## 2023-04-11 NOTE — PATIENT PROFILE ADULT - TOBACCO USE

## 2024-01-02 NOTE — PATIENT PROFILE ADULT - CHOOSE INDICATION TO IMMUNIZE (AN ORDER WILL BE GENERATED WHEN THIS NOTE IS SAVED):
10-Nov-2020 19:07
The patient is a 13y Female complaining of allergic reaction.
Patient is not pregnant (male or female)

## 2025-02-26 NOTE — PRE-OP CHECKLIST - SITE MARKED BY ANESTHESIOLOGIST
Recommend Glucerna 1x daily (285 calories, 14g protein) to provide additional calories and nutrients to encourage PO intake while in house/aid wound healing. 
n/a